# Patient Record
Sex: FEMALE | Race: WHITE | NOT HISPANIC OR LATINO | Employment: UNEMPLOYED | URBAN - METROPOLITAN AREA
[De-identification: names, ages, dates, MRNs, and addresses within clinical notes are randomized per-mention and may not be internally consistent; named-entity substitution may affect disease eponyms.]

---

## 2017-01-03 ENCOUNTER — COMMUNICATION - HEALTHEAST (OUTPATIENT)
Dept: SURGERY | Facility: CLINIC | Age: 32
End: 2017-01-03

## 2021-10-01 ENCOUNTER — TELEPHONE (OUTPATIENT)
Dept: NURSING | Facility: CLINIC | Age: 36
End: 2021-10-01

## 2021-10-01 ENCOUNTER — HOSPITAL ENCOUNTER (EMERGENCY)
Facility: CLINIC | Age: 36
Discharge: HOME | End: 2021-10-01
Attending: EMERGENCY MEDICINE | Admitting: EMERGENCY MEDICINE
Payer: COMMERCIAL

## 2021-10-01 ENCOUNTER — APPOINTMENT (OUTPATIENT)
Dept: CT IMAGING | Facility: CLINIC | Age: 36
End: 2021-10-01
Attending: EMERGENCY MEDICINE
Payer: COMMERCIAL

## 2021-10-01 VITALS
RESPIRATION RATE: 22 BRPM | HEART RATE: 88 BPM | SYSTOLIC BLOOD PRESSURE: 118 MMHG | OXYGEN SATURATION: 96 % | WEIGHT: 188 LBS | BODY MASS INDEX: 32.78 KG/M2 | DIASTOLIC BLOOD PRESSURE: 79 MMHG | TEMPERATURE: 98.1 F

## 2021-10-01 DIAGNOSIS — E88.09 HYPOALBUMINEMIA: ICD-10-CM

## 2021-10-01 DIAGNOSIS — R74.01 ELEVATED ALT MEASUREMENT: ICD-10-CM

## 2021-10-01 DIAGNOSIS — E83.51 HYPOCALCEMIA: ICD-10-CM

## 2021-10-01 DIAGNOSIS — R17 ELEVATED BILIRUBIN: ICD-10-CM

## 2021-10-01 DIAGNOSIS — E87.6 HYPOKALEMIA: ICD-10-CM

## 2021-10-01 DIAGNOSIS — U07.1 INFECTION DUE TO 2019 NOVEL CORONAVIRUS: Primary | ICD-10-CM

## 2021-10-01 LAB
ALBUMIN SERPL-MCNC: 2.5 G/DL (ref 3.4–5)
ALP SERPL-CCNC: 112 U/L (ref 40–150)
ALT SERPL W P-5'-P-CCNC: 54 U/L (ref 0–50)
ANION GAP SERPL CALCULATED.3IONS-SCNC: 9 MMOL/L (ref 3–14)
AST SERPL W P-5'-P-CCNC: 28 U/L (ref 0–45)
BASOPHILS # BLD AUTO: 0 10E3/UL (ref 0–0.2)
BASOPHILS NFR BLD AUTO: 0 %
BILIRUB SERPL-MCNC: 1.4 MG/DL (ref 0.2–1.3)
BUN SERPL-MCNC: 4 MG/DL (ref 7–30)
CALCIUM SERPL-MCNC: 7.9 MG/DL (ref 8.5–10.1)
CHLORIDE BLD-SCNC: 101 MMOL/L (ref 94–109)
CO2 SERPL-SCNC: 24 MMOL/L (ref 20–32)
CREAT SERPL-MCNC: 0.5 MG/DL (ref 0.52–1.04)
D DIMER PPP FEU-MCNC: 1.11 UG/ML FEU (ref 0–0.5)
EOSINOPHIL # BLD AUTO: 0 10E3/UL (ref 0–0.7)
EOSINOPHIL NFR BLD AUTO: 0 %
ERYTHROCYTE [DISTWIDTH] IN BLOOD BY AUTOMATED COUNT: 14.9 % (ref 10–15)
GFR SERPL CREATININE-BSD FRML MDRD: >90 ML/MIN/1.73M2
GLUCOSE BLD-MCNC: 86 MG/DL (ref 70–99)
HCT VFR BLD AUTO: 36.4 % (ref 35–47)
HGB BLD-MCNC: 11.8 G/DL (ref 11.7–15.7)
IMM GRANULOCYTES # BLD: 0.1 10E3/UL
IMM GRANULOCYTES NFR BLD: 1 %
LYMPHOCYTES # BLD AUTO: 1 10E3/UL (ref 0.8–5.3)
LYMPHOCYTES NFR BLD AUTO: 12 %
MCH RBC QN AUTO: 27.6 PG (ref 26.5–33)
MCHC RBC AUTO-ENTMCNC: 32.4 G/DL (ref 31.5–36.5)
MCV RBC AUTO: 85 FL (ref 78–100)
MONOCYTES # BLD AUTO: 0.6 10E3/UL (ref 0–1.3)
MONOCYTES NFR BLD AUTO: 7 %
NEUTROPHILS # BLD AUTO: 6.3 10E3/UL (ref 1.6–8.3)
NEUTROPHILS NFR BLD AUTO: 80 %
NRBC # BLD AUTO: 0 10E3/UL
NRBC BLD AUTO-RTO: 0 /100
PLATELET # BLD AUTO: 189 10E3/UL (ref 150–450)
POTASSIUM BLD-SCNC: 3.2 MMOL/L (ref 3.4–5.3)
PROT SERPL-MCNC: 6.6 G/DL (ref 6.8–8.8)
RBC # BLD AUTO: 4.27 10E6/UL (ref 3.8–5.2)
SODIUM SERPL-SCNC: 134 MMOL/L (ref 133–144)
TROPONIN I SERPL-MCNC: <0.015 UG/L (ref 0–0.04)
WBC # BLD AUTO: 7.9 10E3/UL (ref 4–11)

## 2021-10-01 PROCEDURE — 84484 ASSAY OF TROPONIN QUANT: CPT | Performed by: EMERGENCY MEDICINE

## 2021-10-01 PROCEDURE — 250N000013 HC RX MED GY IP 250 OP 250 PS 637: Performed by: EMERGENCY MEDICINE

## 2021-10-01 PROCEDURE — 250N000009 HC RX 250: Performed by: EMERGENCY MEDICINE

## 2021-10-01 PROCEDURE — 250N000011 HC RX IP 250 OP 636: Performed by: EMERGENCY MEDICINE

## 2021-10-01 PROCEDURE — 80053 COMPREHEN METABOLIC PANEL: CPT | Performed by: EMERGENCY MEDICINE

## 2021-10-01 PROCEDURE — 99285 EMERGENCY DEPT VISIT HI MDM: CPT | Mod: 25 | Performed by: EMERGENCY MEDICINE

## 2021-10-01 PROCEDURE — 85025 COMPLETE CBC W/AUTO DIFF WBC: CPT | Performed by: EMERGENCY MEDICINE

## 2021-10-01 PROCEDURE — 36415 COLL VENOUS BLD VENIPUNCTURE: CPT | Performed by: EMERGENCY MEDICINE

## 2021-10-01 PROCEDURE — 71275 CT ANGIOGRAPHY CHEST: CPT

## 2021-10-01 PROCEDURE — 93005 ELECTROCARDIOGRAM TRACING: CPT | Performed by: EMERGENCY MEDICINE

## 2021-10-01 PROCEDURE — 93010 ELECTROCARDIOGRAM REPORT: CPT | Performed by: EMERGENCY MEDICINE

## 2021-10-01 PROCEDURE — 93308 TTE F-UP OR LMTD: CPT | Performed by: EMERGENCY MEDICINE

## 2021-10-01 PROCEDURE — 85379 FIBRIN DEGRADATION QUANT: CPT | Performed by: EMERGENCY MEDICINE

## 2021-10-01 RX ORDER — POTASSIUM CHLORIDE 20MEQ/15ML
40 LIQUID (ML) ORAL ONCE
Status: COMPLETED | OUTPATIENT
Start: 2021-10-01 | End: 2021-10-01

## 2021-10-01 RX ORDER — VITAMIN B COMPLEX
TABLET ORAL DAILY
COMMUNITY

## 2021-10-01 RX ORDER — ASCORBIC ACID 100 MG
TABLET,CHEWABLE ORAL
Status: ON HOLD | COMMUNITY
End: 2021-11-30

## 2021-10-01 RX ORDER — ACETAMINOPHEN 500 MG
1000 TABLET ORAL ONCE
Status: COMPLETED | OUTPATIENT
Start: 2021-10-01 | End: 2021-10-01

## 2021-10-01 RX ORDER — IOPAMIDOL 755 MG/ML
100 INJECTION, SOLUTION INTRAVASCULAR ONCE
Status: COMPLETED | OUTPATIENT
Start: 2021-10-01 | End: 2021-10-01

## 2021-10-01 RX ORDER — ASPIRIN 81 MG/1
81 TABLET, CHEWABLE ORAL DAILY
Status: ON HOLD | COMMUNITY
End: 2021-12-01

## 2021-10-01 RX ADMIN — IOPAMIDOL 64 ML: 755 INJECTION, SOLUTION INTRAVENOUS at 19:38

## 2021-10-01 RX ADMIN — POTASSIUM CHLORIDE 40 MEQ: 40 SOLUTION ORAL at 18:51

## 2021-10-01 RX ADMIN — ACETAMINOPHEN 1000 MG: 500 TABLET, FILM COATED ORAL at 17:12

## 2021-10-01 RX ADMIN — SODIUM CHLORIDE 84 ML: 9 INJECTION, SOLUTION INTRAVENOUS at 19:49

## 2021-10-01 ASSESSMENT — ENCOUNTER SYMPTOMS
HEMATURIA: 0
COLOR CHANGE: 0
DIFFICULTY URINATING: 0
MYALGIAS: 1
DYSURIA: 0
VOMITING: 0
ABDOMINAL PAIN: 1
FEVER: 1
FREQUENCY: 0
NECK PAIN: 0
NUMBNESS: 0
DIARRHEA: 0
BACK PAIN: 0
WEAKNESS: 0
CHILLS: 1
FATIGUE: 1
CONFUSION: 0
SHORTNESS OF BREATH: 1
HEADACHES: 0
LIGHT-HEADEDNESS: 0
FLANK PAIN: 0
BRUISES/BLEEDS EASILY: 0
NAUSEA: 0
PALPITATIONS: 0
RHINORRHEA: 0
NECK STIFFNESS: 0
WOUND: 0
CONSTIPATION: 0
COUGH: 1

## 2021-10-01 NOTE — ED PROVIDER NOTES
ED Provider Note  [facility]      History     Chief Complaint   Patient presents with     Covid Concern     Covid +, increased SOB, 30 weeks pregnant      HPI  Jorge Anton is a 36 year old female who presented for evaluation of chest pain and shortness of breath.  The patient was diagnosed with COVID-19 approximately 10 days ago.  She is unvaccinated.  She is 30 weeks pregnant today.  with previous history of first trimester miscarriage otherwise remainder of pregnancies have been uncomplicated.  She notes that she had been experiencing a nonproductive cough since COVID-19 diagnosis however yesterday started to experience sternal chest pain and shortness of breath, prompting her to call her OB/GYN who recommended that she come to the ED today for evaluation.  She denies a history of PE/DVT, denies any recent leg swelling.  She has been taking aspirin since her COVID-19 diagnosis.  She also endorses fever and chills, has been taking scheduled Tylenol with improvement.  Denies any flank pain, dysuria, or hematuria.  She notes that with coughing she has some urination but denies any tasha leakage of fluids from her vagina.  Denies any vaginal bleeding.  She notes some generalized abdominal pain with coughing and myalgias, denies any tasha pelvic pain or sensation of contractions. The patient denies any other physical concerns today.     Past Medical History  No past medical history on file.  Past Surgical History:   Procedure Laterality Date      SECTION  10/18/2011    Procedure: SECTION; Surgeon:ZAHRA BRANCH; Location:[facility] L+[facility]      SECTION       LAPAROSCOPIC CHOLECYSTECTOMY N/A 2016    Procedure: LAPAROSCOPIC CHOLECYSTECTOMY;  Surgeon: Gonzalo Powell MD;  Location: [facility] [place], [place];  Service:      Ascorbic Acid (VITAMIN C) 100 MG CHEW  aspirin (ASA) 81 MG chewable tablet  PRENATAL VITAMINS PO  Vitamin D3 (CHOLECALCIFEROL) 25 mcg (1000  units) tablet      No Known Allergies  Family History  Family History   Problem Relation Age of Onset     Arthritis Mother      Cholelithiasis Mother      Cerebrovascular Disease Father      Social History   Social History     Tobacco Use     Smoking status: Never Smoker   Substance Use Topics     Alcohol use: No     Drug use: No      Past medical history, past surgical history, medications, allergies, family history, and social history were reviewed with the patient. No additional pertinent items.       Review of Systems   Constitutional: Positive for chills, fatigue and fever.   HENT: Negative for congestion, ear pain and rhinorrhea.    Eyes: Negative for visual disturbance.   Respiratory: Positive for cough and shortness of breath.    Cardiovascular: Positive for chest pain. Negative for palpitations.   Gastrointestinal: Positive for abdominal pain (Generalized with coughing). Negative for constipation, diarrhea, nausea and vomiting.   Genitourinary: Negative for difficulty urinating, dysuria, flank pain, frequency, hematuria, pelvic pain, urgency, vaginal bleeding and vaginal discharge.   Musculoskeletal: Positive for myalgias. Negative for back pain, neck pain and neck stiffness.   Skin: Negative for color change, rash and wound.   Allergic/Immunologic: Negative for immunocompromised state.   Neurological: Negative for syncope, weakness, light-headedness, numbness and headaches.   Hematological: Does not bruise/bleed easily.   Psychiatric/Behavioral: Negative for confusion.   All other systems reviewed and are negative.    A complete review of systems was performed with pertinent positives and negatives noted in the HPI, and all other systems negative.    Physical Exam   BP: 112/75  Pulse: (!) 122  Temp: 98.8  F (37.1  C)  Resp: 16  Weight: 85.3 kg (188 lb)  SpO2: 94 %     Physical Exam  Vitals and nursing note reviewed.   Constitutional:       General: She is not in acute distress.     Appearance: She is not  ill-appearing, toxic-appearing or diaphoretic.   HENT:      Head: Normocephalic and atraumatic.      Right Ear: External ear normal.      Left Ear: External ear normal.      Nose: Nose normal.      Mouth/Throat:      Mouth: Mucous membranes are moist.   Eyes:      Extraocular Movements: Extraocular movements intact.      Conjunctiva/sclera: Conjunctivae normal.      Pupils: Pupils are equal, round, and reactive to light.   Cardiovascular:      Rate and Rhythm: Regular rhythm. Tachycardia present.      Pulses: Normal pulses.      Heart sounds: Normal heart sounds. No murmur heard.   No friction rub. No gallop.    Pulmonary:      Effort: Pulmonary effort is normal. No respiratory distress.      Breath sounds: Normal breath sounds. No stridor. No wheezing, rhonchi or rales.   Chest:      Chest wall: No tenderness.   Abdominal:      General: Bowel sounds are normal. There is no distension.      Palpations: Abdomen is soft.      Tenderness: There is no abdominal tenderness. There is no right CVA tenderness, left CVA tenderness, guarding or rebound.      Comments: Gravid uterus.   Musculoskeletal:         General: Normal range of motion.      Cervical back: Normal range of motion and neck supple. No rigidity or tenderness.      Right lower leg: No edema.      Left lower leg: No edema.   Skin:     General: Skin is warm.      Capillary Refill: Capillary refill takes less than 2 seconds.   Neurological:      General: No focal deficit present.      Mental Status: She is alert.   Psychiatric:         Mood and Affect: Mood normal.         Behavior: Behavior normal.         ED Course     ED Course as of Oct 02 0001   Fri Oct 01, 2021   1641 Chamber size and motion were grossly normal with LV > RV, normal cardiac kinesis.  No pericardial effusion was found.  IVC visualized and findings indicate normovolemia.    POC US ECHO LIMITED   1805 WBC: 7.9   1805 Hemoglobin: 11.8   1805 Platelet Count: 189   1824 D-Dimer Quantitative(!):  1.11   1824 Sodium: 134   1824 Potassium(!): 3.2   1824 Creatinine(!): 0.50   1824 Glucose: 86   1825 Bilirubin Total(!): 1.4   1825 ALT(!): 54   1825 AST: 28   1825 Albumin(!): 2.5   1825 Protein Total(!): 6.6   1825 Alkaline Phosphatase: 112   1826 Troponin I: <0.015   2013 1.  No pulmonary embolism.  2.  Multiple peripheral geographic regions of groundglass infiltrates in the lungs with regions of intralobular thickening typical of COVID-19 pneumonia.   CT Chest Pulmonary Embolism w Contrast     Procedures: none.             EKG Interpretation:      Interpreted by Marcy Fowler MD  Time reviewed: 16:38  Symptoms at time of EKG: Chest pain, shortness of breath  Rhythm: sinus tachycardia  Rate: normal  Axis: normal  Ectopy: none  Conduction: normal  ST Segments/ T Waves: No ST elevation, possible 1 mm ST depression in V5 and V6 but otherwise no acute abnormalities.  Q Waves: none  Comparison to prior: Tachycardic today when compared to previous.    Clinical Impression: Sinus tachycardia.  _______________________________________________     The medical record was reviewed and interpreted.  Current labs reviewed and interpreted.  Current images reviewed and interpreted: as above.  EKG reviewed and interpreted: as above.      Medications   acetaminophen (TYLENOL) tablet 1,000 mg (1,000 mg Oral Given 10/1/21 1712)   potassium chloride (KAYCIEL) solution 40 mEq (40 mEq Oral Given 10/1/21 1851)   iopamidol (ISOVUE-370) solution 100 mL (64 mLs Intravenous Given 10/1/21 1938)   sodium chloride 0.9 % bag 100mL (84 mLs Intravenous Given 10/1/21 1949)        Assessments & Plan (with Medical Decision Making)   Nursing notes and vital signs reviewed.     Presentation, exam, and workup is most consistent with COVID-19 infection, elevated ALT/bilirubin, hypokalemia.    Please see HPI, ROS, exam, and ED course above for pertinent history and findings. In short, the patient presented to the ED for evaluation of known COVID-19  diagnosis with new onset of chest pain and shortness of breath.      Given that she is currently 30 weeks pregnant and has had COVID-19 for approximately 10 days, concern for pulmonary embolism given the onset of chest pain and shortness of breath.  D-dimer did return elevated 1110.  Discussed with the patient the risks and benefit of CT chest given that she is currently pregnant.  We did discuss that given that she is 30 weeks pregnant that the fetuses anatomy is grossly developed and less concern for radiation causing abnormal development at this time.  We discussed the risk of having underlying pulmonary embolism and not performing CT which could be life-threatening.  After shared decision making the patient is comfortable with undergoing CT chest for further evaluation given her increased risk during pregnancy and COVID-19 for pulmonary embolism; thankfully CT chest negative for acute evidence for PE.  Does show changes consistent with COVID-19 pneumonia.  Patient is afebrile without leukocytosis, these findings are most consistent with a viral COVID-19 and less concerning for bacterial pneumonia, no indication for antibiotics at this time.  Patient's SPO2 remained above 90% throughout her ED course.  We discussed using her pulse oximeter at home and discussed that this reads less than 90% that she needs started return right away for reevaluation.    Otherwise, EKG shows sinus tachycardia with possible 1 mm depression in V5 and V6 but otherwise without acute abnormalities, troponin undetectable, less concern for ACS at this time.  Labs with slight elevation of bilirubin and ALT, patient is status post cholecystectomy, overall less likely secondary to her COVID-19, we discussed that she needs to follow-up with her primary care physician for repeat labs within the next 1 week.  Also slightly low potassium and calcium as well as low protein and albumin, also discussed these findings with the patient and that she  needs recheck within the next 1 week.    In regards her pregnancy, OB/GYN was consulted and the patient was monitored on TOCO and cleared by OB for outpatient follow-up.  From their standpoint no indication for admission at this time.  And from her COVID-19 diagnosis standpoint no indication for medical admission.    On re-evaluation, the patient is resting comfortably after the above interventions. I discussed with the patient the results of the above studies. All questions were answered prior to discharge, and the patient was told to follow up with her primary care physician and OB per discharge instructions. Reasons for return as well as follow up were reviewed with the patient.  The patient expressed understanding and agreement.    Disposition: The patient was discharged to home in stable condition.      Final diagnoses:   Infection due to 2019 novel coronavirus   Elevated ALT measurement   Elevated bilirubin   Hypoalbuminemia   Hypocalcemia   Hypokalemia       --  Marcy Fowler MD   [facility] ED EMERGENCY DEPARTMENT  10/1/2021     Marcy Fowler MD  10/02/21 0005

## 2021-10-01 NOTE — TELEPHONE ENCOUNTER
Telephone call:    Pt stated that she talked with a provider that told her that she needs to go to the ED for chest pain, but she is wondering what location. She stated that she has been seen at [facility] where they were able to monitor her and baby at the same time.     Writer advised that she would start in the ED and notify them of her symptoms. No triage needed. Pt was already advised based on symptoms per her report to go to ED.     Marcy Wang RN  [facility] Nurse Advisor 3:25 PM 10/1/2021

## 2021-10-02 LAB
ATRIAL RATE - MUSE: 109 BPM
DIASTOLIC BLOOD PRESSURE - MUSE: NORMAL MMHG
INTERPRETATION ECG - MUSE: NORMAL
P AXIS - MUSE: 44 DEGREES
PR INTERVAL - MUSE: 130 MS
QRS DURATION - MUSE: 86 MS
QT - MUSE: 338 MS
QTC - MUSE: 455 MS
R AXIS - MUSE: 18 DEGREES
SYSTOLIC BLOOD PRESSURE - MUSE: NORMAL MMHG
T AXIS - MUSE: 34 DEGREES
VENTRICULAR RATE- MUSE: 109 BPM

## 2021-10-02 NOTE — DISCHARGE INSTRUCTIONS
"Follow-up with your OB/GYN on Monday for reevaluation.    As we discussed, your CT showed \"Multiple peripheral geographic regions of groundglass infiltrates in the lungs with regions of intralobular thickening typical of COVID-19 pneumonia.\"  As we also discussed your ALT (liver test) and bilirubin were slightly elevated, as well as your potassium, calcium, and albumin (protein) levels were low.  Please follow-up with your primary care doctor on Monday for reevaluation and repeat labs.    If you have any increase in shortness of breath or you have any oxygen saturation reading on pulse oximetry less than 90% or you have contractions or you have any other symptoms that are concerning to you please return for reevaluation.  "

## 2021-10-02 NOTE — PROGRESS NOTES
This RN bedside in ED for EFM. Dr Fish was paged at 2050, and spoke on phone at 2057 regarding EFM. No contractions noted, and episodic intermittent variables x3 noted during 40 minute monitoring session. Plan was to continue EFM x1 hour. At 2102, pt began having contractions. Mild per patient, and pt states they are the same as she has been having at home. Contractions palpated mild and soft at rest. Fetus is Cat I. Paged Dr Fish at 2151 with an update on FM, ctx stopped at 2153. Spoke to Dr Fish on phone and okay to discontinue EFM. Pt educated on FKC, monitoring for vaginal bleeding/leaking of flui, when to return to the hospital/L&D, and to follow up with primary OB provider this coming week. Pt verbalized understanding. Primary nurse updated on OB care.

## 2021-11-30 ENCOUNTER — HOSPITAL ENCOUNTER (INPATIENT)
Facility: CLINIC | Age: 36
LOS: 2 days | Discharge: HOME HEALTH | End: 2021-12-02
Attending: OBSTETRICS & GYNECOLOGY | Admitting: OBSTETRICS & GYNECOLOGY
Payer: COMMERCIAL

## 2021-11-30 DIAGNOSIS — O34.219 VBAC, DELIVERED: Primary | ICD-10-CM

## 2021-11-30 PROBLEM — Z90.49 HISTORY OF CHOLECYSTECTOMY: Status: ACTIVE | Noted: 2021-05-02

## 2021-11-30 PROBLEM — Z36.89 ENCOUNTER FOR TRIAGE IN PREGNANT PATIENT: Status: RESOLVED | Noted: 2021-11-30 | Resolved: 2021-11-30

## 2021-11-30 PROBLEM — Z34.90 ENCOUNTER FOR INDUCTION OF LABOR: Status: ACTIVE | Noted: 2021-11-30

## 2021-11-30 PROBLEM — U07.1 COVID-19: Status: ACTIVE | Noted: 2021-11-30

## 2021-11-30 PROBLEM — Z98.891 HISTORY OF C-SECTION: Status: ACTIVE | Noted: 2021-11-30

## 2021-11-30 PROBLEM — Z34.80 PRENATAL CARE OF MULTIGRAVIDA, ANTEPARTUM: Status: ACTIVE | Noted: 2021-04-19

## 2021-11-30 PROBLEM — Z82.79 FAMILY HISTORY OF NEUROFIBROMATOSIS: Status: ACTIVE | Noted: 2021-05-02

## 2021-11-30 PROBLEM — Z36.89 ENCOUNTER FOR TRIAGE IN PREGNANT PATIENT: Status: ACTIVE | Noted: 2021-11-30

## 2021-11-30 PROBLEM — O09.90 OBSTETRIC RISK IN CURRENTLY PREGNANT PATIENT: Status: ACTIVE | Noted: 2021-05-02

## 2021-11-30 LAB
ABO/RH(D): NORMAL
ALT SERPL W P-5'-P-CCNC: 34 U/L (ref 0–50)
ANTIBODY SCREEN: NEGATIVE
AST SERPL W P-5'-P-CCNC: 22 U/L (ref 0–45)
CREAT SERPL-MCNC: 0.46 MG/DL (ref 0.52–1.04)
CREAT UR-MCNC: 106 MG/DL
GFR SERPL CREATININE-BSD FRML MDRD: >90 ML/MIN/1.73M2
HOLD SPECIMEN: NORMAL
PLATELET # BLD AUTO: 205 10E3/UL (ref 150–450)
PROT UR-MCNC: 0.34 G/L
PROT/CREAT 24H UR: 0.32 G/G CR (ref 0–0.2)
SPECIMEN EXPIRATION DATE: NORMAL
T PALLIDUM AB SER QL: NONREACTIVE

## 2021-11-30 PROCEDURE — 84156 ASSAY OF PROTEIN URINE: CPT | Performed by: STUDENT IN AN ORGANIZED HEALTH CARE EDUCATION/TRAINING PROGRAM

## 2021-11-30 PROCEDURE — 84450 TRANSFERASE (AST) (SGOT): CPT | Performed by: STUDENT IN AN ORGANIZED HEALTH CARE EDUCATION/TRAINING PROGRAM

## 2021-11-30 PROCEDURE — 59025 FETAL NON-STRESS TEST: CPT

## 2021-11-30 PROCEDURE — 86780 TREPONEMA PALLIDUM: CPT | Performed by: STUDENT IN AN ORGANIZED HEALTH CARE EDUCATION/TRAINING PROGRAM

## 2021-11-30 PROCEDURE — 86900 BLOOD TYPING SEROLOGIC ABO: CPT | Performed by: STUDENT IN AN ORGANIZED HEALTH CARE EDUCATION/TRAINING PROGRAM

## 2021-11-30 PROCEDURE — 84460 ALANINE AMINO (ALT) (SGPT): CPT | Performed by: STUDENT IN AN ORGANIZED HEALTH CARE EDUCATION/TRAINING PROGRAM

## 2021-11-30 PROCEDURE — 258N000003 HC RX IP 258 OP 636: Performed by: STUDENT IN AN ORGANIZED HEALTH CARE EDUCATION/TRAINING PROGRAM

## 2021-11-30 PROCEDURE — 250N000009 HC RX 250: Performed by: STUDENT IN AN ORGANIZED HEALTH CARE EDUCATION/TRAINING PROGRAM

## 2021-11-30 PROCEDURE — 82565 ASSAY OF CREATININE: CPT | Performed by: STUDENT IN AN ORGANIZED HEALTH CARE EDUCATION/TRAINING PROGRAM

## 2021-11-30 PROCEDURE — 120N000002 HC R&B MED SURG/OB UMMC

## 2021-11-30 PROCEDURE — 85049 AUTOMATED PLATELET COUNT: CPT | Performed by: STUDENT IN AN ORGANIZED HEALTH CARE EDUCATION/TRAINING PROGRAM

## 2021-11-30 PROCEDURE — G0463 HOSPITAL OUTPT CLINIC VISIT: HCPCS | Mod: 25

## 2021-11-30 PROCEDURE — 36415 COLL VENOUS BLD VENIPUNCTURE: CPT | Performed by: STUDENT IN AN ORGANIZED HEALTH CARE EDUCATION/TRAINING PROGRAM

## 2021-11-30 RX ORDER — OXYTOCIN/0.9 % SODIUM CHLORIDE 30/500 ML
100-340 PLASTIC BAG, INJECTION (ML) INTRAVENOUS CONTINUOUS PRN
Status: DISCONTINUED | OUTPATIENT
Start: 2021-11-30 | End: 2021-12-02 | Stop reason: HOSPADM

## 2021-11-30 RX ORDER — TRANEXAMIC ACID 10 MG/ML
1 INJECTION, SOLUTION INTRAVENOUS EVERY 30 MIN PRN
Status: DISCONTINUED | OUTPATIENT
Start: 2021-11-30 | End: 2021-12-01 | Stop reason: HOSPADM

## 2021-11-30 RX ORDER — MISOPROSTOL 200 UG/1
800 TABLET ORAL
Status: DISCONTINUED | OUTPATIENT
Start: 2021-11-30 | End: 2021-12-01 | Stop reason: HOSPADM

## 2021-11-30 RX ORDER — IBUPROFEN 600 MG/1
600 TABLET, FILM COATED ORAL
Status: DISCONTINUED | OUTPATIENT
Start: 2021-11-30 | End: 2021-12-01

## 2021-11-30 RX ORDER — LIDOCAINE 40 MG/G
CREAM TOPICAL
Status: DISCONTINUED | OUTPATIENT
Start: 2021-11-30 | End: 2021-12-01 | Stop reason: HOSPADM

## 2021-11-30 RX ORDER — KETOROLAC TROMETHAMINE 30 MG/ML
30 INJECTION, SOLUTION INTRAMUSCULAR; INTRAVENOUS
Status: DISCONTINUED | OUTPATIENT
Start: 2021-11-30 | End: 2021-12-01

## 2021-11-30 RX ORDER — PROCHLORPERAZINE 25 MG
25 SUPPOSITORY, RECTAL RECTAL EVERY 12 HOURS PRN
Status: DISCONTINUED | OUTPATIENT
Start: 2021-11-30 | End: 2021-12-01 | Stop reason: HOSPADM

## 2021-11-30 RX ORDER — NALOXONE HYDROCHLORIDE 0.4 MG/ML
0.4 INJECTION, SOLUTION INTRAMUSCULAR; INTRAVENOUS; SUBCUTANEOUS
Status: DISCONTINUED | OUTPATIENT
Start: 2021-11-30 | End: 2021-12-01 | Stop reason: HOSPADM

## 2021-11-30 RX ORDER — SODIUM CHLORIDE, SODIUM LACTATE, POTASSIUM CHLORIDE, CALCIUM CHLORIDE 600; 310; 30; 20 MG/100ML; MG/100ML; MG/100ML; MG/100ML
INJECTION, SOLUTION INTRAVENOUS CONTINUOUS PRN
Status: DISCONTINUED | OUTPATIENT
Start: 2021-11-30 | End: 2021-12-01 | Stop reason: HOSPADM

## 2021-11-30 RX ORDER — TERBUTALINE SULFATE 1 MG/ML
0.25 INJECTION, SOLUTION SUBCUTANEOUS
Status: DISCONTINUED | OUTPATIENT
Start: 2021-11-30 | End: 2021-12-01 | Stop reason: HOSPADM

## 2021-11-30 RX ORDER — OXYTOCIN/0.9 % SODIUM CHLORIDE 30/500 ML
340 PLASTIC BAG, INJECTION (ML) INTRAVENOUS CONTINUOUS PRN
Status: DISCONTINUED | OUTPATIENT
Start: 2021-11-30 | End: 2021-12-01 | Stop reason: HOSPADM

## 2021-11-30 RX ORDER — OXYTOCIN 10 [USP'U]/ML
10 INJECTION, SOLUTION INTRAMUSCULAR; INTRAVENOUS
Status: DISCONTINUED | OUTPATIENT
Start: 2021-11-30 | End: 2021-12-01 | Stop reason: HOSPADM

## 2021-11-30 RX ORDER — CARBOPROST TROMETHAMINE 250 UG/ML
250 INJECTION, SOLUTION INTRAMUSCULAR
Status: DISCONTINUED | OUTPATIENT
Start: 2021-11-30 | End: 2021-12-01 | Stop reason: HOSPADM

## 2021-11-30 RX ORDER — MISOPROSTOL 200 UG/1
400 TABLET ORAL
Status: DISCONTINUED | OUTPATIENT
Start: 2021-11-30 | End: 2021-12-01 | Stop reason: HOSPADM

## 2021-11-30 RX ORDER — ONDANSETRON 4 MG/1
4 TABLET, ORALLY DISINTEGRATING ORAL EVERY 6 HOURS PRN
Status: DISCONTINUED | OUTPATIENT
Start: 2021-11-30 | End: 2021-12-01 | Stop reason: HOSPADM

## 2021-11-30 RX ORDER — OXYTOCIN 10 [USP'U]/ML
10 INJECTION, SOLUTION INTRAMUSCULAR; INTRAVENOUS
Status: DISCONTINUED | OUTPATIENT
Start: 2021-11-30 | End: 2021-12-02 | Stop reason: HOSPADM

## 2021-11-30 RX ORDER — CALCIUM CARBONATE 500 MG/1
1000 TABLET, CHEWABLE ORAL DAILY PRN
Status: DISCONTINUED | OUTPATIENT
Start: 2021-11-30 | End: 2021-12-02 | Stop reason: HOSPADM

## 2021-11-30 RX ORDER — LIDOCAINE 40 MG/G
CREAM TOPICAL
Status: DISCONTINUED | OUTPATIENT
Start: 2021-11-30 | End: 2021-11-30 | Stop reason: HOSPADM

## 2021-11-30 RX ORDER — METOCLOPRAMIDE 10 MG/1
10 TABLET ORAL EVERY 6 HOURS PRN
Status: DISCONTINUED | OUTPATIENT
Start: 2021-11-30 | End: 2021-12-01 | Stop reason: HOSPADM

## 2021-11-30 RX ORDER — PROCHLORPERAZINE MALEATE 10 MG
10 TABLET ORAL EVERY 6 HOURS PRN
Status: DISCONTINUED | OUTPATIENT
Start: 2021-11-30 | End: 2021-12-01 | Stop reason: HOSPADM

## 2021-11-30 RX ORDER — NALOXONE HYDROCHLORIDE 0.4 MG/ML
0.2 INJECTION, SOLUTION INTRAMUSCULAR; INTRAVENOUS; SUBCUTANEOUS
Status: DISCONTINUED | OUTPATIENT
Start: 2021-11-30 | End: 2021-12-01 | Stop reason: HOSPADM

## 2021-11-30 RX ORDER — MISOPROSTOL 200 UG/1
TABLET ORAL
Status: DISCONTINUED
Start: 2021-11-30 | End: 2021-12-01 | Stop reason: HOSPADM

## 2021-11-30 RX ORDER — METHYLERGONOVINE MALEATE 0.2 MG/ML
200 INJECTION INTRAVENOUS
Status: DISCONTINUED | OUTPATIENT
Start: 2021-11-30 | End: 2021-12-01 | Stop reason: HOSPADM

## 2021-11-30 RX ORDER — ONDANSETRON 2 MG/ML
4 INJECTION INTRAMUSCULAR; INTRAVENOUS EVERY 6 HOURS PRN
Status: DISCONTINUED | OUTPATIENT
Start: 2021-11-30 | End: 2021-12-01 | Stop reason: HOSPADM

## 2021-11-30 RX ORDER — OXYTOCIN 10 [USP'U]/ML
INJECTION, SOLUTION INTRAMUSCULAR; INTRAVENOUS
Status: DISCONTINUED
Start: 2021-11-30 | End: 2021-12-01 | Stop reason: HOSPADM

## 2021-11-30 RX ORDER — LIDOCAINE HYDROCHLORIDE 10 MG/ML
INJECTION, SOLUTION EPIDURAL; INFILTRATION; INTRACAUDAL; PERINEURAL
Status: DISCONTINUED
Start: 2021-11-30 | End: 2021-12-01 | Stop reason: HOSPADM

## 2021-11-30 RX ORDER — METOCLOPRAMIDE HYDROCHLORIDE 5 MG/ML
10 INJECTION INTRAMUSCULAR; INTRAVENOUS EVERY 6 HOURS PRN
Status: DISCONTINUED | OUTPATIENT
Start: 2021-11-30 | End: 2021-12-01 | Stop reason: HOSPADM

## 2021-11-30 RX ORDER — OXYTOCIN/0.9 % SODIUM CHLORIDE 30/500 ML
1-24 PLASTIC BAG, INJECTION (ML) INTRAVENOUS CONTINUOUS
Status: DISCONTINUED | OUTPATIENT
Start: 2021-11-30 | End: 2021-12-01 | Stop reason: HOSPADM

## 2021-11-30 RX ADMIN — Medication 2 MILLI-UNITS/MIN: at 17:25

## 2021-11-30 RX ADMIN — SODIUM CHLORIDE, POTASSIUM CHLORIDE, SODIUM LACTATE AND CALCIUM CHLORIDE: 600; 310; 30; 20 INJECTION, SOLUTION INTRAVENOUS at 17:18

## 2021-11-30 ASSESSMENT — ACTIVITIES OF DAILY LIVING (ADL)
WALKING_OR_CLIMBING_STAIRS_DIFFICULTY: NO
DIFFICULTY_COMMUNICATING: NO
TOILETING_ISSUES: NO
PATIENT_/_FAMILY_COMMUNICATION_STYLE: SPOKEN LANGUAGE (ENGLISH OR BILINGUAL)
HEARING_DIFFICULTY_OR_DEAF: NO
DRESSING/BATHING_DIFFICULTY: NO
DIFFICULTY_EATING/SWALLOWING: NO
VISION_MANAGEMENT: GLASSES
CONCENTRATING,_REMEMBERING_OR_MAKING_DECISIONS_DIFFICULTY: NO
WEAR_GLASSES_OR_BLIND: YES
FALL_HISTORY_WITHIN_LAST_SIX_MONTHS: NO
DOING_ERRANDS_INDEPENDENTLY_DIFFICULTY: NO

## 2021-11-30 ASSESSMENT — MIFFLIN-ST. JEOR: SCORE: 1561.32

## 2021-11-30 NOTE — PLAN OF CARE
Patient admitted to room 481 for Induction of labor.Vaginal exam performed by Dr. Lezama 3/50%. Plan to start Pitocin with continuous fetal monitoring. Verbal order to monitor blood pressures every half hour during early admission process. Reviewed with patient labor interventions and coping techniques such as bouncing on ball, ambulating, and frequent reposition. Telemetry monitor applied to assist patient with mobility. Reviewed monitoring of intake and output and diet restrictions. Patient in agreement with induction plan. Call light within reach.

## 2021-11-30 NOTE — H&P
[facility]  OB History & Physical      Jorge Anton MRN# [id number]   Age: 36 year old YOB: 1985     CC:  Elevated blood pressures    HPI:  Jorge Anton is a 36 year old  at 38w3d by LMP c/w 6w6d US, who presents with elevated blood pressure readings at home. She states that she has had a few elevated readings at prenatal visits at [facility], [place], however repeat was always normal. Today she noted some floaters in her vision, which is now improving. She denies headache, chest pain, SOB, RUQ pain.  She denies contractions, vaginal bleeding, and loss of fluid. Reports normal fetal movement.    She denies any past history of elevated blood pressures or preeclampsia in prior pregnancies. This is a new FOB and his prior partner had preeclampsia x2.    Denies any history of postpartum hemorrhage, high blood pressures, asthma, shoulder dystocia. She has had a laparoscopic cholecystectomy in addition to C/S x1.    ROS:   Complete 10-point ROS negative except as noted in HPI.She headache, chest pain, shortness of breath, RUQ pain.    Pregnancy Complications:  - Family history of neurofibromatosis, son with Type I  - H/o fetus with SVT and hydrops  - H/o C/S x1 for Cat 2 RFD,  x2  - COVID in pregnancy, 10/1/21    Prenatal Labs:   Lab Results   Component Value Date    ABO O 10/18/2011    RH  Pos 10/18/2011    AS Neg 10/18/2011    HEPBANG Negative 2016    CHPCRT  2011     Negative for C. trachomatis rRNA by transcription mediated amplification.   A negative result by transcription mediated amplification does not preclude the   presence of C. trachomatis infection because results are dependent on proper   and adequate collection, absence of inhibitors, and sufficient rRNA to be   detected.    GCPCRT  2011     Negative for N. gonorrhoeae rRNA by transcription mediated amplification.   A negative result by transcription mediated amplification does not preclude  the   presence of N. gonorrhoeae infection because results are dependent on proper   and adequate collection, absence of inhibitors, and sufficient rRNA to be   detected.    TREPAB Negative 2011    RPR Non-Reactive 2014    RUBELLAABIGG 27 2011    HGB 11.8 10/01/2021    HIV Negative 2011       GBS Status:   Lab Results   Component Value Date    GBS  10/14/2011     Negative: No GBS DNA detected, presumed negative for GBS or number of bacteria   may be below the limit of detection of the assay.   Assay performed on incubated broth culture of specimen using Cepheid   SmartCycler(R) real-time PCR.       OB History  OB History    Para Term  AB Living   6 4 3 1 1 4   SAB IAB Ectopic Multiple Live Births   1 0 0 1 4      # Outcome Date GA Lbr Ten/2nd Weight Sex Delivery Anes PTL Lv   6 Current            5A Term     F    YAS   5B             4 Term     M    YAS   3  10/18/11 35w3d  2.52 kg (5 lb 8.9 oz) M CS-LVertical Spinal N YAS      Name: ALMA DELIA GREEN      Apgar1: 8  Apgar5: 9   2 Term  40w4d  3.685 kg (8 lb 2 oz) M Vag-Spont   YAS      Name: Raiden   1 SAB                PMHx: None  PSHx:   Past Surgical History:   Procedure Laterality Date     SECTION  10/18/2011    Procedure: SECTION; Surgeon:ZAHRA BRANCH; Location: L+D    LAPAROSCOPIC CHOLECYSTECTOMY N/A 2016    Procedure: LAPAROSCOPIC CHOLECYSTECTOMY;  Surgeon: Gonzalo Powell MD;  Location: [facility];  Service:      Meds:   Medications Prior to Admission   Medication Sig Dispense Refill Last Dose    aspirin (ASA) 81 MG chewable tablet Take 81 mg by mouth daily   More than a month at Unknown time    PRENATAL VITAMINS PO Take  by mouth daily.   2021 at Unknown time    Vitamin D3 (CHOLECALCIFEROL) 25 mcg (1000 units) tablet Take by mouth daily   Unknown at Unknown time         Allergies:  No Known Allergies   FmHx:   Family History   Problem  "Relation Age of Onset    Arthritis Mother     Cholelithiasis Mother     Cerebrovascular Disease Father      SocHx:  She denies any tobacco, alcohol, or other drug use during this pregnancy.    PE:  Vit:   Patient Vitals for the past 24 hrs:   BP Temp Temp src Pulse Resp Height Weight   21 1100 133/83 98.1  F (36.7  C) Oral 91 20 -- --   21 1058 -- -- -- -- -- 1.6 m (5' 3\") 90.7 kg (200 lb)       Gen: Well-appearing, NAD, comfortable   CV: Well perfused, regular rate   Pulm: Breathing comfortably  Abd: Soft, gravid, non-tender   Ext: 1+ LE edema b/l           FHT: Baseline 140, mod variability, accelerations present, no decelerations     Assessment/Plan:  Jorge Anton is a 36 year old , at 38w3d by LMP c/w 6w6d US, who presents with elevated pressures at home, here for rule out preeclampsia.    Preeclampsia without Severe Features  - Patient with elevated pressures up to 150/90s today with floaters in her vision. Now with continued mild range pressures. Elevated pressures > 4 hours apart with history of BP > 140/90s at home, will continue to monitor BP closely  - UPC elevated at 0.32  - HELLP labs o/w wnl    IOL for Pre E w/out SF  TOLAC  - Admit to L&D  - Labor: Anticipate . Cervix is 3/50/-3 and patient is TOLAC. Will start pitocin. Consider AROM when fetal head is better applied  - FWB: Category 1 FHT.  Continue EFM and toco. EFW 8lbs.  - Pain: Desires unmedicated delivery, possibly considering nitrous.  - PNC: Rh pos, Rubella imm, GBS neg, GCT wnl, Placenta posterior  - Fen/GI: Reg diet, IVF    The patient was discussed with Dr. Eddy who is in agreement with the treatment plan.    Angeline Lezama MD  Ob/Gyn Resident, PGY-2  2021 11:27 AM    Appreciate Dr. Lezama's note above, patient also seen and examined by me. I agree with the note above.   Patty Eddy MD      "

## 2021-11-30 NOTE — PLAN OF CARE
Data: Patient presented to [facility] at 1015.   Reason for maternal/fetal assessment per patient is Rule Out Pre-eclampsia  .  Patient is a . Prenatal record reviewed.      OB History    Para Term  AB Living   6 4 3 1 1 4   SAB IAB Ectopic Multiple Live Births   1 0 0 1 4      # Outcome Date GA Lbr Ten/2nd Weight Sex Delivery Anes PTL Lv   6 Current            5A Term     F    YAS   5B             4 Term     M    YAS   3  10/18/11 35w3d  2.52 kg (5 lb 8.9 oz) M CS-LVertical Spinal N YAS      Name: ALMA DELIA GREEN MAMADOU      Apgar1: 8  Apgar5: 9   2 Term  40w4d  3.685 kg (8 lb 2 oz) M Vag-Spont   YAS      Name: Annetta   1 SAB            . Medical history: No past medical history on file.. Gestational Age 38w4d. VSS. Fetal movement present. Patient denies cramping, backache, vaginal discharge, pelvic pressure, UTI symptoms, GI problems, bloody show, vaginal bleeding, edema, headache, visual disturbances, epigastric or URQ pain, abdominal pain, rupture of membranes. Support persons are present.  Action: Verbal consent for EFM. Triage assessment completed. EFM applied for NST. Uterine assessment reveal occasional contraction. Fetal assessment: Presumed adequate fetal oxygenation documented (see flow record).   Response: Dr. Eddy informed of arrival. Plan per provider is admit for induction. Patient verbalized agreement with plan. Patient transferred to room 481 ambulatory, oriented to room and call light.

## 2021-12-01 PROBLEM — O34.219 VBAC, DELIVERED: Status: ACTIVE | Noted: 2021-12-01

## 2021-12-01 LAB — HGB BLD-MCNC: 11 G/DL (ref 11.7–15.7)

## 2021-12-01 PROCEDURE — 36415 COLL VENOUS BLD VENIPUNCTURE: CPT | Performed by: ADVANCED PRACTICE MIDWIFE

## 2021-12-01 PROCEDURE — 120N000002 HC R&B MED SURG/OB UMMC

## 2021-12-01 PROCEDURE — 722N000001 HC LABOR CARE VAGINAL DELIVERY SINGLE

## 2021-12-01 PROCEDURE — 250N000013 HC RX MED GY IP 250 OP 250 PS 637: Performed by: ADVANCED PRACTICE MIDWIFE

## 2021-12-01 PROCEDURE — 250N000011 HC RX IP 250 OP 636: Performed by: STUDENT IN AN ORGANIZED HEALTH CARE EDUCATION/TRAINING PROGRAM

## 2021-12-01 PROCEDURE — 85018 HEMOGLOBIN: CPT | Performed by: ADVANCED PRACTICE MIDWIFE

## 2021-12-01 PROCEDURE — 250N000009 HC RX 250: Performed by: STUDENT IN AN ORGANIZED HEALTH CARE EDUCATION/TRAINING PROGRAM

## 2021-12-01 PROCEDURE — 10907ZC DRAINAGE OF AMNIOTIC FLUID, THERAPEUTIC FROM PRODUCTS OF CONCEPTION, VIA NATURAL OR ARTIFICIAL OPENING: ICD-10-PCS | Performed by: ADVANCED PRACTICE MIDWIFE

## 2021-12-01 RX ORDER — METHYLERGONOVINE MALEATE 0.2 MG/ML
200 INJECTION INTRAVENOUS
Status: DISCONTINUED | OUTPATIENT
Start: 2021-12-01 | End: 2021-12-02 | Stop reason: HOSPADM

## 2021-12-01 RX ORDER — ACETAMINOPHEN 325 MG/1
650 TABLET ORAL EVERY 4 HOURS PRN
Status: DISCONTINUED | OUTPATIENT
Start: 2021-12-01 | End: 2021-12-02 | Stop reason: HOSPADM

## 2021-12-01 RX ORDER — HYDROCORTISONE 2.5 %
CREAM (GRAM) TOPICAL 3 TIMES DAILY PRN
Status: DISCONTINUED | OUTPATIENT
Start: 2021-12-01 | End: 2021-12-02 | Stop reason: HOSPADM

## 2021-12-01 RX ORDER — IBUPROFEN 800 MG/1
800 TABLET, FILM COATED ORAL EVERY 6 HOURS PRN
Status: DISCONTINUED | OUTPATIENT
Start: 2021-12-01 | End: 2021-12-02 | Stop reason: HOSPADM

## 2021-12-01 RX ORDER — MISOPROSTOL 200 UG/1
800 TABLET ORAL
Status: DISCONTINUED | OUTPATIENT
Start: 2021-12-01 | End: 2021-12-02 | Stop reason: HOSPADM

## 2021-12-01 RX ORDER — ACETAMINOPHEN 325 MG/1
650 TABLET ORAL EVERY 4 HOURS PRN
Start: 2021-12-01 | End: 2021-12-02

## 2021-12-01 RX ORDER — IBUPROFEN 800 MG/1
800 TABLET, FILM COATED ORAL EVERY 6 HOURS PRN
Start: 2021-12-01 | End: 2021-12-02

## 2021-12-01 RX ORDER — OXYTOCIN 10 [USP'U]/ML
10 INJECTION, SOLUTION INTRAMUSCULAR; INTRAVENOUS
Status: DISCONTINUED | OUTPATIENT
Start: 2021-12-01 | End: 2021-12-02 | Stop reason: HOSPADM

## 2021-12-01 RX ORDER — MISOPROSTOL 200 UG/1
400 TABLET ORAL
Status: DISCONTINUED | OUTPATIENT
Start: 2021-12-01 | End: 2021-12-02 | Stop reason: HOSPADM

## 2021-12-01 RX ORDER — DOCUSATE SODIUM 100 MG/1
100 CAPSULE, LIQUID FILLED ORAL DAILY
Start: 2021-12-02

## 2021-12-01 RX ORDER — OXYTOCIN/0.9 % SODIUM CHLORIDE 30/500 ML
340 PLASTIC BAG, INJECTION (ML) INTRAVENOUS CONTINUOUS PRN
Status: DISCONTINUED | OUTPATIENT
Start: 2021-12-01 | End: 2021-12-02 | Stop reason: HOSPADM

## 2021-12-01 RX ORDER — MODIFIED LANOLIN
OINTMENT (GRAM) TOPICAL
Status: DISCONTINUED | OUTPATIENT
Start: 2021-12-01 | End: 2021-12-02 | Stop reason: HOSPADM

## 2021-12-01 RX ORDER — CARBOPROST TROMETHAMINE 250 UG/ML
250 INJECTION, SOLUTION INTRAMUSCULAR
Status: DISCONTINUED | OUTPATIENT
Start: 2021-12-01 | End: 2021-12-02 | Stop reason: HOSPADM

## 2021-12-01 RX ORDER — DOCUSATE SODIUM 100 MG/1
100 CAPSULE, LIQUID FILLED ORAL DAILY
Status: DISCONTINUED | OUTPATIENT
Start: 2021-12-01 | End: 2021-12-02 | Stop reason: HOSPADM

## 2021-12-01 RX ORDER — TRANEXAMIC ACID 10 MG/ML
1 INJECTION, SOLUTION INTRAVENOUS EVERY 30 MIN PRN
Status: DISCONTINUED | OUTPATIENT
Start: 2021-12-01 | End: 2021-12-02 | Stop reason: HOSPADM

## 2021-12-01 RX ORDER — BISACODYL 10 MG
10 SUPPOSITORY, RECTAL RECTAL DAILY PRN
Status: DISCONTINUED | OUTPATIENT
Start: 2021-12-01 | End: 2021-12-02 | Stop reason: HOSPADM

## 2021-12-01 RX ADMIN — ACETAMINOPHEN 650 MG: 325 TABLET, FILM COATED ORAL at 17:03

## 2021-12-01 RX ADMIN — MISOPROSTOL 400 MCG: 200 TABLET ORAL at 02:11

## 2021-12-01 RX ADMIN — IBUPROFEN 800 MG: 800 TABLET, FILM COATED ORAL at 19:04

## 2021-12-01 RX ADMIN — ACETAMINOPHEN 650 MG: 325 TABLET, FILM COATED ORAL at 13:13

## 2021-12-01 RX ADMIN — IBUPROFEN 800 MG: 800 TABLET, FILM COATED ORAL at 07:35

## 2021-12-01 RX ADMIN — Medication 340 ML/HR: at 00:50

## 2021-12-01 RX ADMIN — IBUPROFEN 800 MG: 800 TABLET, FILM COATED ORAL at 13:12

## 2021-12-01 RX ADMIN — KETOROLAC TROMETHAMINE 30 MG: 30 INJECTION, SOLUTION INTRAMUSCULAR at 01:14

## 2021-12-01 RX ADMIN — DOCUSATE SODIUM 100 MG: 100 CAPSULE ORAL at 07:35

## 2021-12-01 RX ADMIN — ACETAMINOPHEN 650 MG: 325 TABLET, FILM COATED ORAL at 07:35

## 2021-12-01 NOTE — PLAN OF CARE
Data: Vital signs within normal limits. Postpartum checks within normal limits - see flow record. Patient eating and drinking normally. Patient able to empty bladder independently and is up ambulating. No apparent signs of infection. Patient performing self cares and is able to care for infant. Breastfeeding on cue. Mother stated two of her other children were on bili lights - sticky note left for provider.   Action: Patient stated she was comfortable and declined interventions. Patient education done about hand expression. See flow record.  Response: Positive attachment behaviors observed with infant. Support persons Bryce present.   Plan: Continue with plan of care.

## 2021-12-01 NOTE — PROGRESS NOTES
"Labor progress note    S:  Patient sitting on the ball, reports feeling some contractions.  Consents to cervical exam and amniotomy if safe.      O:  Blood pressure 130/87, pulse 80, temperature 97.6  F (36.4  C), temperature source Oral, resp. rate 18, height 1.6 m (5' 3\"), weight 90.7 kg (200 lb), not currently breastfeeding.  General appearance: comfortable.  Contractions: Every 1-3 minutes. 30-70 seconds duration.  Palpate: moderate.  FHT: Baseline 140 with moderate variability. Accelerations are present. No decelerations present.  ROM: amniotomy performed for large amount of clear fluid.  Pelvic exam: 4.5/ 50%/ Mid/ average/ -1  Chan Score: 7  Pitocin- 4 mu/min.,  Antibiotics- none      A:  IUP @ 38w4d IOL for pre-eclampsia without severe features   Fetal Heart rate tracing Category one  GBS- negative  Patient Active Problem List   Diagnosis     CARDIOVASCULAR SCREENING; LDL GOAL LESS THAN 160     Encounter for induction of labor     Family history of neurofibromatosis     History of cholecystectomy     Obstetric risk in currently pregnant patient     Prenatal care of multigravida, antepartum     History of      COVID-19         P:  Encouraged frequent position changes   Anticipate   ANNETTE Atkins CNM  " normal...

## 2021-12-01 NOTE — PLAN OF CARE
Data: Jorge Anton transferred to 7138 via wheelchair at 0301. Baby transferred via parent's arms.  Action: Receiving unit notified of transfer: Yes. Patient and family notified of room change. Report given to ALEXANDRA Harry at 0254. Belongings sent to receiving unit. Accompanied by Registered Nurse. Oriented patient to surroundings. Call light within reach. ID bands double-checked with receiving RN.  Response: Patient tolerated transfer and is stable.

## 2021-12-01 NOTE — PROVIDER NOTIFICATION
12/01/21 0205   Provider Notification   Provider Name/Title JOSE Moreno   Method of Notification Phone   Notification Reason Bleeding   On the phone with JOSE Moreno to report extra bleeding with a few clots. Fundus firm, U/U-1/U. Offered to get patient up and empty bladder, but patient does not feel urge to void. Per provider will give misoprostol.

## 2021-12-01 NOTE — PROGRESS NOTES
Post Partum Note day of delivery   SIGNIFICANT PROBLEMS:  Patient Active Problem List    Diagnosis Date Noted     , delivered 2021     Priority: Medium     Encounter for induction of labor 2021     Priority: Medium     History of  2021     Priority: Medium     Hx of , followed by c/s for fetal indications, then  x2.  Planning        COVID-19 2021     Priority: Medium     Patient had symptomatic infection 10/2021, nasal swab not performed on admission.  Currently asymptomatic.  Has not been vaccinated.       Family history of neurofibromatosis 2021     Priority: Medium     Formatting of this note might be different from the original.  Patient's biological son       History of cholecystectomy 2021     Priority: Medium     Obstetric risk in currently pregnant patient 2021     Priority: Medium     Formatting of this note might be different from the original.  2011: Hx of fetus with SVT, junctional tachycardia, and fetal hydrops.  C/S at 36w 1d.       Prenatal care of multigravida, antepartum 2021     Priority: Medium     Formatting of this note might be different from the original.  Patient seen for an initial OB visit 2021 and at [facility] for u/s on 21.  Message sent to clinical staff to reach out and assist her to schedule on 8/10/2021.    Formatting of this note might be different from the original.  Midwife patient  AMA, age 36 at delivery  LAST MENSTRUAL PERIOD. 3/4/12  HALEY 21.Working HALEY    Hx child with electrical issues in heart, diagnosed at 32 weeks gestation- C/S at 36 weeks  Hx child with neurofibromatosis   Current pregnancy is with a different father  Hx + GBS  Prepregnancy BMI: 31.59  Early GCT: not indicated as patient states she is physically active  ASA: not indicated  Dated by:   Would accept blood products: yes  Previous deliveries reviewed by MD:    screening:   Screening US:     Rh pending  GCT:  "  Hgb:  TDap:  GBS:  Hx of C/S x 1  MFMU score: 90.7%  Plans: TOLAC  TOLAC consent:   Placental location:       CARDIOVASCULAR SCREENING; LDL GOAL LESS THAN 160 10/31/2010     Priority: Medium       INTERVAL HISTORY:  /79   Pulse 92   Temp 98.2  F (36.8  C) (Oral)   Resp 18   Ht 1.6 m (5' 3\")   Wt 90.7 kg (200 lb)   Breastfeeding Unknown   BMI 35.43 kg/m    Pt stable, baby is rooming in.   Breast feeding status:initiated  Complications since 2 hours post delivery: None  # Pain Assessment:  Current Pain Score 12/1/2021   Patient currently in pain? denies   Pain score (0-10) -   Pain location -   Jorge callahan pain level was assessed and she currently denies pain.      Patient is tolerating activity well, Voiding without difficulty, cramping is relieved by Ibuprophen, lochia is decreasing and patient denies clots.  Perineal pain is is relieved by Ibuprophen.  The perineum is intact    Physical Exam:  General: Bright affect, loving with baby. Family supportive.   Breasts: soft, nontender, nipples intact, no cracking, redness or bruising.   Abdomen: soft, nontender, fundus below U.   Lochia: small amount, rubra, no clots or odor.   Perineum: well-approximated.   Extremities: no edema, Jaky's negative.     Postpartum hemoglobin   Hemoglobin   Date Value Ref Range Status   12/01/2021 11.0 (L) 11.7 - 15.7 g/dL Final   10/19/2011 11.4 (L) 11.7 - 15.7 g/dL Final     Blood type   Lab Results   Component Value Date    ABO O 10/18/2011       Lab Results   Component Value Date    RH  Pos 10/18/2011     Rubella status No results found for: FRED  Rubella: immue  History of depression: no    ASSESSMENT/PLAN:  Normal postpartum exam , Stable Post-partum day #0  Complications:none  Plan d/c home tomorrow. Home Visit Ordered- No  RTC  2 and 6 weeks  Postpartum warning s/s reviewed, including bleeding/clots, fever, mastitis, or depression  Kegels/ crunches  Continue prenatal vitamins  Birthcontrol planned:Undecided. Fertility " and contraception options reviewed.  Current Discharge Medication List      CONTINUE these medications which have NOT CHANGED    Details   aspirin (ASA) 81 MG chewable tablet Take 81 mg by mouth daily      PRENATAL VITAMINS PO Take  by mouth daily.      Vitamin D3 (CHOLECALCIFEROL) 25 mcg (1000 units) tablet Take by mouth daily           ANNETTE Champagne CNM

## 2021-12-01 NOTE — PROGRESS NOTES
"Labor progress note    S:  Assuming cares at 1900 ([facility] transferred [facility], desires CNM care).  Patient feeling well at this time.  Denies headache, visual changes or epigastric pain.    O:  Blood pressure 130/87, pulse 80, temperature 97.6  F (36.4  C), temperature source Oral, resp. rate 18, height 1.6 m (5' 3\"), weight 90.7 kg (200 lb), not currently breastfeeding.  General appearance: comfortable.  Contractions: Every 2-6 minutes. 40-90 seconds duration.  Palpate: mild.  FHT: Baseline 140 with moderate variability. Accelerations are present. No decelerations present.  ROM: not ruptured.   Pelvic exam:  deferred  Pitocin- 4 mu/min.,  Antibiotics- none      A:  IUP @ 38w4d IOL for pre-eclampsia without severe features   Fetal Heart rate tracing Category one  GBS- negative  Covid positive 10/1/2021   Patient Active Problem List   Diagnosis     CARDIOVASCULAR SCREENING; LDL GOAL LESS THAN 160     Encounter for induction of labor     Family history of neurofibromatosis     History of cholecystectomy     Obstetric risk in currently pregnant patient     Prenatal care of multigravida, antepartum     History of      COVID-19         P:  Reviewed CNM care in labor as long as Magnesium sulfate is not needed.  Recovered symptomatic Covid positive from 10/2021, encouraged mask wearing.  Will offer vaccine PP  TOLAC consent signed and placed in chart.    Consider amniotomy when contraction pattern is consistent  Anticipate   ANNETTE AtkinsM  "

## 2021-12-01 NOTE — PROGRESS NOTES
"Labor progress note    S:  Patient reporting increased pelvic pressure with contractions.  Breathing well, changing positions frequently.  Requesting cervical exam at this time.    O:  Blood pressure (!) 141/95, pulse 79, temperature 97.7  F (36.5  C), temperature source Oral, resp. rate 16, height 1.6 m (5' 3\"), weight 90.7 kg (200 lb), not currently breastfeeding.  General appearance: uncomfortable with contractions.  Contractions: Every 2 minutes. 40-60 seconds duration.  Palpate: strong.  FHT: Baseline 140 with moderate variability. Accelerations are present. Occasional variable decelerations present.  ROM: clear fluid.   Pelvic exam: 5/ 100%/ Mid/ soft/ 0    Pitocin- 4 mu/min.,  Antibiotics- none      A:  IUP @ 38w5d IOL for pre-eclampsia without severe features   Fetal Heart rate tracing Category one  GBS- negative  Patient Active Problem List   Diagnosis     CARDIOVASCULAR SCREENING; LDL GOAL LESS THAN 160     Encounter for induction of labor     Family history of neurofibromatosis     History of cholecystectomy     Obstetric risk in currently pregnant patient     Prenatal care of multigravida, antepartum     History of      COVID-19         P:  Labor support given   Anticipate   ANNETTE Atkins CNM  "

## 2021-12-01 NOTE — PLAN OF CARE
VSS and afebrile. Infant girl @0049 and placed skin to skin for lusty cry. Mother/father bonding well. Infant breastfeeding. Waiting for stool and void from infant. Mother uterus firm and at umbilicus or 1 above. Unable to void. Mother having extra bleeding after delivery and misoprostol given. Bleeding is none to scant after receiving misoprostol. Patient and infant doing well. Will continue with plan of care.

## 2021-12-01 NOTE — PLAN OF CARE
Pt breast feeding independently. Vitals & PP assessments within normal range. Up at amie & soaking in tub. Voiding without difficulties. PO pain meds relieved her cramping. Indep with self & baby cares.  Encouraged to complete paper work.   Will continue on supportive cares.

## 2021-12-01 NOTE — PROGRESS NOTES
Patient arrived to [facility] unit via wheelchair at 0300,with belongings, accompanied by spouse/ significant other, with infant in arms. Received report from Charmaine MORRIS and checked bands. Unit and room orientation completd. Call light given; no concerns present at this time. Continue with plan of care.

## 2021-12-01 NOTE — PLAN OF CARE
Patient up walking around room and changing positions frequently including bouncing on ball, side lying, utilizing peanut ball, and swaying. Rupture of membranes by Demetrice Moreno CNM this evening with large amount of clear fluid. Contractions intensified shortly following rupture tracing every 1-3 minutes. States is coping with pain right now and declines pain interventions at this time. Trace edema noted to lower extremities. Vital signs remain within order parameters. Category 1 fetal tracing. Pitocin infusing at 4 milliunits, continue to titrate.Patient aware when to call for RN and verbalized agreement with labor progression plan.  and friend at bedside providing supporting. Continue to evaluate.

## 2021-12-01 NOTE — L&D DELIVERY NOTE
Delivery Summary    Jorge Anton MRN# [id number]   Age: 36 year old YOB: 1985     ASSESSMENT & PLAN:   DELIVERY NOTE:  Brief Labor Course: Patient is an out of hospital transfer from [facility] for evaluation of elevated blood pressure at home.  Patient was evaluated and diagnosed with pre-eclampsia without severe features and was admitted for induction of labor.  Cervix was favorable, Pitocin initiated.  TOLAC consent signed and placed in chart.  Patient consented to amniotomy when fetal station was lower and regular contraction pattern was established.  Amniotomy performed for large amount of clear fluid.  Patient utilized Nitrous oxide for a short time in transition labor.    Delivery Note:   Patient progressed to complete rapidly after amniotomy performed.  Began spontaneous maternal pushing efforts with excellent progress while on right lateral side.  Fetal head brought to a crown in the MOA position, loose nuchal cord reduced prior to delivery.  Fetus restituted to LOT, anterior shoulder delivered without difficulty under gentle downward traction and maternal effort.  Vigorous male infant delivered and placed on maternal abdomen.  IV Pitocin infusing.  Delayed cord clamping completed and cord doubly clamped and cut by father of the baby.  Placenta delivered spontaneously intact via Schultze mechanism with 3 vessel cord.  4cm umbilical cord varix noted.  Perineum inspected, small skin laceration to right of introitus, hemostatic not requiring repair.  Mother and baby stable in recovery.    IUP at 38 weeks gestation delivered on 2021.     delivery of a viable Male infant.  Weight : 7 pounds 8 ounces 3420gms  Apgars of 9 at 1 minute and 9 at 5 minutes.  Labor was induced.  Medications administered  in labor:  Pain Rx Nitrous Oxide; Antibiotics No;   Perineum: Minor laceration - No repair  Placenta-mechanism: spontaneous, intact,  with a 3 vessel cord. IV  oxytocin was given After delivery of baby Before delivery of placenta  Quantitative Blood Loss was 522cc.  Complications of labor and delivery: umbilical cord varix  Anticipated Discharge Date: 2021  Birth attendants: JOSE Martinez APRN CNM       [facility] [[id number][facility]    Labor Event Times    Labor onset date: 21 Onset time:  9:15 PM   Dilation complete date: 21 Complete time: 12:40 AM   Start pushing date/time: 2021 0040      Labor Length    1st Stage (hrs): 3 (min): 25   2nd Stage (hrs): 0 (min): 9   3rd Stage (hrs): 0 (min): 6      Labor Events     labor?: No   steroids: None  Labor Type: TOLAC (Trial of Labor After )  Predominate monitoring during 1st stage: continuous electronic fetal monitoring     Antibiotics received during labor?: No     Rupture date/time: 21   Rupture type: Artificial Rupture of Membranes  Fluid color: Clear  Fluid odor: Normal     Induction: Oxytocin, AROM  Induction date/time:     Cervical ripening date/time:     Indications for induction: Hypertension     1:1 continuous labor support provided by?: RN Labor partogram used?: no      Delivery/Placenta Date and Time    Delivery Date: 21 Delivery Time: 12:49 AM   Placenta Date/Time: 2021 12:55 AM  Oxytocin given at the time of delivery: after delivery of baby  Delivering clinician: Demetrice Moreno APRN CNM   Other personnel present at delivery:  Provider Role   Charmaine Roman RN Fike, Cortni S, RN          Vaginal Counts     Initial count performed by 2 team members:  Two Team Members   JOSE Moreno       Needles Suture Needles Sponges (RETIRED) Instruments   Initial counts 2 0 5    Added to count       Relief counts       Final counts 2 0 5          Placed during labor Accounted for at the end of labor   FSE No NA   IUPC No NA   Cervadil No NA              Final count performed by 2 team members:  Two Team Members   JOSE Moreno  "  Charmaine Roman RN         Apgars    Living status: Living   1 Minute 5 Minute 10 Minute 15 Minute 20 Minute   Skin color: 1  1       Heart rate: 2  2       Reflex irritability: 2  2       Muscle tone: 2  2       Respiratory effort: 2  2       Total: 9  9       Apgars assigned by: JOHN MORRIS     Cord    Vessels: 3 Vessels    Cord Complications: Nuchal   Nuchal Intervention: reduced         Nuchal cord description: loose nuchal cord         Cord Blood Disposition: Lab    Gases Sent?: No    Delayed cord clamping?: Yes    Cord Clamping Delay (seconds):  seconds    Stem cell collection?: No        Resuscitation    Methods: None   Care at Delivery:  of viable male infant. To mother's abdomen, dried and stimulated with vigorous cry. Remains skin to skin.     [place] Measurements    Weight: 7 lb 8.6 oz Length: 1' 8.75\"   Head circumference: 33.7 cm       Skin to Skin and Feeding Plan    Skin to skin initiation date/time: 1841    Skin to skin with: Mother  Skin to skin end date/time: 1841    Breastfeeding initiated date/time: 2021 0105     Delivery (Maternal) (Provider to Complete) ([id number])    Episiotomy: None  Perineal lacerations: None    Repair suture: None     Blood Loss  Mother: Jorge Anton #[id number]   Start of Mother's Information    Delivery Blood Loss  21 2115 - 21 0607    Delivery QBL (mL) Hospital Encounter 522 mL    Total  522 mL         End of Mother's Information  Mother: Jorge Anton #[id number]          Delivery - Provider to Complete ([id number])    Delivering clinician: Demetrice Moreno APRN CNM  CNM Care: Any CNM care in labor  Attempted Delivery Types (Choose all that apply): Vaginal Birth After   Delivery Type (Choose the 1 that will go to the Birth History): , Spontaneous                   Other personnel:  Provider Role   Charmaine Roman RN Fike, Cortni S, RN                 Placenta    Date/Time: 2021 12:55 " AM  Removal: Spontaneous  Comments: Intact via Schultze mechanism with 3 vessl cord.  4cm cord varix noted.  Disposition: Hospital disposal           Anesthesia    Method: Nitrous Oxide                Presentation and Position    Presentation: Vertex    Position: Middle Occiput Anterior                 ANNETTE Atkins CNM

## 2021-12-02 VITALS
TEMPERATURE: 97.7 F | HEART RATE: 72 BPM | HEIGHT: 63 IN | BODY MASS INDEX: 35.44 KG/M2 | SYSTOLIC BLOOD PRESSURE: 117 MMHG | WEIGHT: 200 LBS | RESPIRATION RATE: 16 BRPM | DIASTOLIC BLOOD PRESSURE: 77 MMHG

## 2021-12-02 PROCEDURE — 250N000013 HC RX MED GY IP 250 OP 250 PS 637: Performed by: ADVANCED PRACTICE MIDWIFE

## 2021-12-02 RX ORDER — ACETAMINOPHEN 325 MG/1
650 TABLET ORAL EVERY 4 HOURS PRN
Start: 2021-12-02

## 2021-12-02 RX ORDER — IBUPROFEN 800 MG/1
800 TABLET, FILM COATED ORAL EVERY 6 HOURS PRN
Qty: 30 TABLET | Refills: 0 | Status: SHIPPED | OUTPATIENT
Start: 2021-12-02

## 2021-12-02 RX ADMIN — DOCUSATE SODIUM 100 MG: 100 CAPSULE ORAL at 07:41

## 2021-12-02 RX ADMIN — IBUPROFEN 800 MG: 800 TABLET, FILM COATED ORAL at 07:42

## 2021-12-02 NOTE — PLAN OF CARE
Data: Vital signs within normal limits. Postpartum checks within normal limits - see flow record. Patient eating and drinking normally. Patient able to empty bladder independently and is up ambulating. No apparent signs of infection. Patient performing self cares and is able to care for infant. Breastfeeding on cue, infant feeding frequently overnight.  Action: Patient stated she was comfortable and declined pain medications. Patient education done about CCHD and  screen. See flow record.  Response: Positive attachment behaviors observed with infant. Support persons Bryce present.   Plan: Anticipate discharge on .

## 2021-12-02 NOTE — PLAN OF CARE
Pt discharged to home. Instructions given/reviewed. ID bands double checked. Pt had no further questions &verbalized understanding her plan. Instructed to follow up at clinic within 2 & 6 weeks for PP check.

## 2021-12-02 NOTE — PLAN OF CARE
VSS, fundus firm, bleeding scant. Breastfeeding independently. Pain controlled with scheduled meds. FOB at bedside supportive. Will continue with plan of care.

## 2021-12-02 NOTE — DISCHARGE SUMMARY
[facility] Discharge Summary    Jorge Anton MRN# [id number]   Age: 36 year old YOB: 1985     Date of Admission:  2021  Date of Discharge::  2021  Admitting Physician:  Demetrice Moreno, ANNETTE GARCIA  Discharge Physician:  ANNETTE Champagne CNM, CNM, MS      Home clinic: [facility]           Admission Diagnoses:   Encounter for triage in pregnant patient [Z36.89]  Encounter for induction of labor [Z34.90]  , delivered [O34.219]          Discharge Diagnosis:   Normal spontaneous vaginal delivery  Intrauterine pregnancy at 38w5 weeks gestation          Procedures:   Procedure(s): No additional procedures performed               Medications Prior to Admission:     Medications Prior to Admission   Medication Sig Dispense Refill Last Dose     PRENATAL VITAMINS PO Take  by mouth daily.   2021 at Unknown time     Vitamin D3 (CHOLECALCIFEROL) 25 mcg (1000 units) tablet Take by mouth daily   Unknown at Unknown time     [DISCONTINUED] aspirin (ASA) 81 MG chewable tablet Take 81 mg by mouth daily   More than a month at Unknown time             Discharge Medications:     Current Discharge Medication List      START taking these medications    Details   acetaminophen (TYLENOL) 325 MG tablet Take 2 tablets (650 mg) by mouth every 4 hours as needed for mild pain or fever (greater than or equal to 38  C /100.4  F (oral) or 38.5  C/ 101.4  F (core).)    Associated Diagnoses: , delivered      docusate sodium (COLACE) 100 MG capsule Take 1 capsule (100 mg) by mouth daily    Associated Diagnoses: , delivered      ibuprofen (ADVIL/MOTRIN) 800 MG tablet Take 1 tablet (800 mg) by mouth every 6 hours as needed for other (cramping)  Qty: 30 tablet, Refills: 0    Associated Diagnoses: , delivered         CONTINUE these medications which have NOT CHANGED    Details   PRENATAL VITAMINS PO Take  by mouth daily.      Vitamin D3 (CHOLECALCIFEROL) 25 mcg (1000 units) tablet Take by  mouth daily         STOP taking these medications       aspirin (ASA) 81 MG chewable tablet Comments:   Reason for Stopping:                     Consultations:   No consultations were requested during this admission          Brief History of Labor:     DELIVERY NOTE:  Brief Labor Course: Patient is an out of hospital transfer from [facility] for evaluation of elevated blood pressure at home.  Patient was evaluated and diagnosed with pre-eclampsia without severe features and was admitted for induction of labor.  Cervix was favorable, Pitocin initiated.  TOLAC consent signed and placed in chart.  Patient consented to amniotomy when fetal station was lower and regular contraction pattern was established.  Amniotomy performed for large amount of clear fluid.  Patient utilized Nitrous oxide for a short time in transition labor.    Delivery Note:   Patient progressed to complete rapidly after amniotomy performed.  Began spontaneous maternal pushing efforts with excellent progress while on right lateral side.  Fetal head brought to a crown in the MOA position, loose nuchal cord reduced prior to delivery.  Fetus restituted to LOT, anterior shoulder delivered without difficulty under gentle downward traction and maternal effort.  Vigorous male infant delivered and placed on maternal abdomen.  IV Pitocin infusing.  Delayed cord clamping completed and cord doubly clamped and cut by father of the baby.  Placenta delivered spontaneously intact via Schultze mechanism with 3 vessel cord.  4cm umbilical cord varix noted.  Perineum inspected, small skin laceration to right of introitus, hemostatic not requiring repair.  Mother and baby stable in recovery.     IUP at 38 weeks gestation delivered on 2021.     delivery of a viable Male infant.  Weight : 7 pounds 8 ounces 3420gms  Apgars of 9 at 1 minute and 9 at 5 minutes.  Labor was induced.  Medications administered  in labor:  Pain Rx Nitrous  "Oxide; Antibiotics No;   Perineum: Minor laceration - No repair  Placenta-mechanism: spontaneous, intact,  with a 3 vessel cord. IV oxytocin was given After delivery of baby Before delivery of placenta  Quantitative Blood Loss was 522cc.  Complications of labor and delivery: umbilical cord varix  Anticipated Discharge Date: 2021  Birth attendants: JOSE Martinez APRN CNM      Assessment Day of Discharge    Vital signs:  Temp: 97.7  F (36.5  C) Temp src: Oral BP: 117/77 Pulse: 72   Resp: 16       Height: 160 cm (5' 3\") Weight: 90.7 kg (200 lb)  Estimated body mass index is 35.43 kg/m  as calculated from the following:    Height as of this encounter: 1.6 m (5' 3\").    Weight as of this encounter: 90.7 kg (200 lb).      Breasts: Soft, filling  Nipples: Intact, Non-tender  Abdomen: Soft, Non-tender    Diastatis Recti:  2 FB  Uterus: Fundus Firm, Non-tender, located 2 below the umbilicus   Lochia: Rubra, appropriate amount    Perineum:  Well-approximated, healing well  Lower Extremities:  Edema Bilateral, Negative Jaky's Sign           Hospital Course:   The patient's hospital course was unremarkable.  On discharge, her pain was well controlled. Vaginal bleeding is similar to peak menstrual flow.  Voiding without difficulty.  Ambulating well and tolerating a normal diet.  No fever.  Breastfeeding well.  Infant is stable.  No bowel movement yet.*  She was discharged on post-partum day #1.    Post-partum hemoglobin:   Hemoglobin   Date Value Ref Range Status   2021 11.0 (L) 11.7 - 15.7 g/dL Final   10/19/2011 11.4 (L) 11.7 - 15.7 g/dL Final        Rh:POS  Rubella status:immune   Plan for contraception: condoms   Reviewed Chapter One of  [facility] including warning signs of postpartum, activity level, avoiding IC for 6 weeks, Tub soaks BID, Kegels, abdominal exercises, breast care,  postpartum depression/anxiety. Pt verbalized understanding with teach back.          Discharge " Instructions and Follow-Up:   Discharge diet: Regular   Discharge activity: Activity as tolerated   Discharge follow-up: Follow up with midwife  in 2 and 6 weeks   Wound care: Drink plenty of fluids           Discharge Disposition:   Discharged to home        ANNETTE Champagne CNM

## 2021-12-07 ENCOUNTER — ALLIED HEALTH/NURSE VISIT (OUTPATIENT)
Dept: OBGYN | Facility: CLINIC | Age: 36
End: 2021-12-07
Payer: COMMERCIAL

## 2021-12-07 ENCOUNTER — HOSPITAL ENCOUNTER (INPATIENT)
Facility: CLINIC | Age: 36
LOS: 2 days | Discharge: HOME | End: 2021-12-09
Attending: ADVANCED PRACTICE MIDWIFE | Admitting: OBSTETRICS & GYNECOLOGY
Payer: COMMERCIAL

## 2021-12-07 ENCOUNTER — TELEPHONE (OUTPATIENT)
Dept: OBGYN | Facility: CLINIC | Age: 36
End: 2021-12-07
Payer: COMMERCIAL

## 2021-12-07 VITALS — DIASTOLIC BLOOD PRESSURE: 101 MMHG | SYSTOLIC BLOOD PRESSURE: 155 MMHG

## 2021-12-07 DIAGNOSIS — O34.219 VBAC, DELIVERED: Primary | ICD-10-CM

## 2021-12-07 LAB
ABO/RH(D): NORMAL
ALT SERPL W P-5'-P-CCNC: 33 U/L (ref 0–50)
ANTIBODY SCREEN: NEGATIVE
AST SERPL W P-5'-P-CCNC: 16 U/L (ref 0–45)
CREAT SERPL-MCNC: 0.7 MG/DL (ref 0.52–1.04)
ERYTHROCYTE [DISTWIDTH] IN BLOOD BY AUTOMATED COUNT: 14.8 % (ref 10–15)
GFR SERPL CREATININE-BSD FRML MDRD: >90 ML/MIN/1.73M2
HCT VFR BLD AUTO: 37.5 % (ref 35–47)
HGB BLD-MCNC: 11.9 G/DL (ref 11.7–15.7)
MCH RBC QN AUTO: 28.3 PG (ref 26.5–33)
MCHC RBC AUTO-ENTMCNC: 31.7 G/DL (ref 31.5–36.5)
MCV RBC AUTO: 89 FL (ref 78–100)
PLATELET # BLD AUTO: 259 10E3/UL (ref 150–450)
RBC # BLD AUTO: 4.2 10E6/UL (ref 3.8–5.2)
SPECIMEN EXPIRATION DATE: NORMAL
WBC # BLD AUTO: 7.3 10E3/UL (ref 4–11)

## 2021-12-07 PROCEDURE — 36415 COLL VENOUS BLD VENIPUNCTURE: CPT | Performed by: STUDENT IN AN ORGANIZED HEALTH CARE EDUCATION/TRAINING PROGRAM

## 2021-12-07 PROCEDURE — 120N000002 HC R&B MED SURG/OB UMMC

## 2021-12-07 PROCEDURE — 84450 TRANSFERASE (AST) (SGOT): CPT | Performed by: STUDENT IN AN ORGANIZED HEALTH CARE EDUCATION/TRAINING PROGRAM

## 2021-12-07 PROCEDURE — 250N000013 HC RX MED GY IP 250 OP 250 PS 637: Performed by: STUDENT IN AN ORGANIZED HEALTH CARE EDUCATION/TRAINING PROGRAM

## 2021-12-07 PROCEDURE — 999N000103 HC STATISTIC NO CHARGE FACILITY FEE

## 2021-12-07 PROCEDURE — 86850 RBC ANTIBODY SCREEN: CPT | Performed by: STUDENT IN AN ORGANIZED HEALTH CARE EDUCATION/TRAINING PROGRAM

## 2021-12-07 PROCEDURE — 84460 ALANINE AMINO (ALT) (SGPT): CPT | Performed by: STUDENT IN AN ORGANIZED HEALTH CARE EDUCATION/TRAINING PROGRAM

## 2021-12-07 PROCEDURE — 82565 ASSAY OF CREATININE: CPT | Performed by: STUDENT IN AN ORGANIZED HEALTH CARE EDUCATION/TRAINING PROGRAM

## 2021-12-07 PROCEDURE — 258N000003 HC RX IP 258 OP 636: Performed by: STUDENT IN AN ORGANIZED HEALTH CARE EDUCATION/TRAINING PROGRAM

## 2021-12-07 PROCEDURE — 99221 1ST HOSP IP/OBS SF/LOW 40: CPT | Mod: 24 | Performed by: OBSTETRICS & GYNECOLOGY

## 2021-12-07 PROCEDURE — 85027 COMPLETE CBC AUTOMATED: CPT | Performed by: STUDENT IN AN ORGANIZED HEALTH CARE EDUCATION/TRAINING PROGRAM

## 2021-12-07 PROCEDURE — 250N000011 HC RX IP 250 OP 636: Performed by: STUDENT IN AN ORGANIZED HEALTH CARE EDUCATION/TRAINING PROGRAM

## 2021-12-07 RX ORDER — CALCIUM GLUCONATE 94 MG/ML
1 INJECTION, SOLUTION INTRAVENOUS
Status: DISCONTINUED | OUTPATIENT
Start: 2021-12-07 | End: 2021-12-09 | Stop reason: HOSPADM

## 2021-12-07 RX ORDER — OXYTOCIN 10 [USP'U]/ML
10 INJECTION, SOLUTION INTRAMUSCULAR; INTRAVENOUS
Status: DISCONTINUED | OUTPATIENT
Start: 2021-12-07 | End: 2021-12-09 | Stop reason: HOSPADM

## 2021-12-07 RX ORDER — IBUPROFEN 800 MG/1
800 TABLET, FILM COATED ORAL EVERY 6 HOURS PRN
Status: DISCONTINUED | OUTPATIENT
Start: 2021-12-07 | End: 2021-12-09 | Stop reason: HOSPADM

## 2021-12-07 RX ORDER — LIDOCAINE 40 MG/G
CREAM TOPICAL
Status: DISCONTINUED | OUTPATIENT
Start: 2021-12-07 | End: 2021-12-09 | Stop reason: HOSPADM

## 2021-12-07 RX ORDER — LABETALOL HYDROCHLORIDE 5 MG/ML
20-80 INJECTION, SOLUTION INTRAVENOUS EVERY 10 MIN PRN
Status: DISCONTINUED | OUTPATIENT
Start: 2021-12-07 | End: 2021-12-09 | Stop reason: HOSPADM

## 2021-12-07 RX ORDER — MAGNESIUM SULFATE HEPTAHYDRATE 40 MG/ML
4 INJECTION, SOLUTION INTRAVENOUS
Status: DISCONTINUED | OUTPATIENT
Start: 2021-12-07 | End: 2021-12-09 | Stop reason: HOSPADM

## 2021-12-07 RX ORDER — MAGNESIUM SULFATE HEPTAHYDRATE 500 MG/ML
10 INJECTION, SOLUTION INTRAMUSCULAR; INTRAVENOUS
Status: DISCONTINUED | OUTPATIENT
Start: 2021-12-07 | End: 2021-12-09 | Stop reason: HOSPADM

## 2021-12-07 RX ORDER — MISOPROSTOL 200 UG/1
800 TABLET ORAL
Status: DISCONTINUED | OUTPATIENT
Start: 2021-12-07 | End: 2021-12-09 | Stop reason: HOSPADM

## 2021-12-07 RX ORDER — ACETAMINOPHEN 325 MG/1
650 TABLET ORAL EVERY 4 HOURS PRN
Status: DISCONTINUED | OUTPATIENT
Start: 2021-12-07 | End: 2021-12-09 | Stop reason: HOSPADM

## 2021-12-07 RX ORDER — MISOPROSTOL 200 UG/1
400 TABLET ORAL
Status: DISCONTINUED | OUTPATIENT
Start: 2021-12-07 | End: 2021-12-09 | Stop reason: HOSPADM

## 2021-12-07 RX ORDER — OXYTOCIN/0.9 % SODIUM CHLORIDE 30/500 ML
340 PLASTIC BAG, INJECTION (ML) INTRAVENOUS CONTINUOUS PRN
Status: DISCONTINUED | OUTPATIENT
Start: 2021-12-07 | End: 2021-12-09 | Stop reason: HOSPADM

## 2021-12-07 RX ORDER — MAGNESIUM SULFATE HEPTAHYDRATE 40 MG/ML
4 INJECTION, SOLUTION INTRAVENOUS ONCE
Status: COMPLETED | OUTPATIENT
Start: 2021-12-07 | End: 2021-12-07

## 2021-12-07 RX ORDER — HYDROCORTISONE 2.5 %
CREAM (GRAM) TOPICAL 3 TIMES DAILY PRN
Status: DISCONTINUED | OUTPATIENT
Start: 2021-12-07 | End: 2021-12-09 | Stop reason: HOSPADM

## 2021-12-07 RX ORDER — LORAZEPAM 2 MG/ML
2 INJECTION INTRAMUSCULAR
Status: DISCONTINUED | OUTPATIENT
Start: 2021-12-07 | End: 2021-12-09 | Stop reason: HOSPADM

## 2021-12-07 RX ORDER — BISACODYL 10 MG
10 SUPPOSITORY, RECTAL RECTAL DAILY PRN
Status: DISCONTINUED | OUTPATIENT
Start: 2021-12-07 | End: 2021-12-09 | Stop reason: HOSPADM

## 2021-12-07 RX ORDER — DOCUSATE SODIUM 100 MG/1
100 CAPSULE, LIQUID FILLED ORAL DAILY PRN
Status: DISCONTINUED | OUTPATIENT
Start: 2021-12-07 | End: 2021-12-09 | Stop reason: HOSPADM

## 2021-12-07 RX ORDER — NIFEDIPINE 30 MG/1
30 TABLET, EXTENDED RELEASE ORAL DAILY
Status: DISCONTINUED | OUTPATIENT
Start: 2021-12-07 | End: 2021-12-09 | Stop reason: HOSPADM

## 2021-12-07 RX ORDER — MAGNESIUM SULFATE HEPTAHYDRATE 40 MG/ML
2 INJECTION, SOLUTION INTRAVENOUS
Status: DISCONTINUED | OUTPATIENT
Start: 2021-12-07 | End: 2021-12-09 | Stop reason: HOSPADM

## 2021-12-07 RX ORDER — MAGNESIUM SULFATE IN WATER 40 MG/ML
2 INJECTION, SOLUTION INTRAVENOUS CONTINUOUS
Status: DISCONTINUED | OUTPATIENT
Start: 2021-12-07 | End: 2021-12-08

## 2021-12-07 RX ORDER — HYDRALAZINE HYDROCHLORIDE 20 MG/ML
10 INJECTION INTRAMUSCULAR; INTRAVENOUS
Status: DISCONTINUED | OUTPATIENT
Start: 2021-12-07 | End: 2021-12-09 | Stop reason: HOSPADM

## 2021-12-07 RX ORDER — SODIUM CHLORIDE, SODIUM LACTATE, POTASSIUM CHLORIDE, CALCIUM CHLORIDE 600; 310; 30; 20 MG/100ML; MG/100ML; MG/100ML; MG/100ML
10-125 INJECTION, SOLUTION INTRAVENOUS CONTINUOUS
Status: DISCONTINUED | OUTPATIENT
Start: 2021-12-07 | End: 2021-12-09 | Stop reason: HOSPADM

## 2021-12-07 RX ORDER — TRANEXAMIC ACID 10 MG/ML
1 INJECTION, SOLUTION INTRAVENOUS EVERY 30 MIN PRN
Status: DISCONTINUED | OUTPATIENT
Start: 2021-12-07 | End: 2021-12-09 | Stop reason: HOSPADM

## 2021-12-07 RX ORDER — CARBOPROST TROMETHAMINE 250 UG/ML
250 INJECTION, SOLUTION INTRAMUSCULAR
Status: DISCONTINUED | OUTPATIENT
Start: 2021-12-07 | End: 2021-12-09 | Stop reason: HOSPADM

## 2021-12-07 RX ORDER — MODIFIED LANOLIN
OINTMENT (GRAM) TOPICAL
Status: DISCONTINUED | OUTPATIENT
Start: 2021-12-07 | End: 2021-12-09 | Stop reason: HOSPADM

## 2021-12-07 RX ORDER — METHYLERGONOVINE MALEATE 0.2 MG/ML
200 INJECTION INTRAVENOUS
Status: DISCONTINUED | OUTPATIENT
Start: 2021-12-07 | End: 2021-12-09 | Stop reason: HOSPADM

## 2021-12-07 RX ADMIN — MAGNESIUM SULFATE HEPTAHYDRATE 2 G/HR: 40 INJECTION, SOLUTION INTRAVENOUS at 16:25

## 2021-12-07 RX ADMIN — SODIUM CHLORIDE, POTASSIUM CHLORIDE, SODIUM LACTATE AND CALCIUM CHLORIDE 75 ML/HR: 600; 310; 30; 20 INJECTION, SOLUTION INTRAVENOUS at 15:49

## 2021-12-07 RX ADMIN — MAGNESIUM SULFATE HEPTAHYDRATE 4 G: 40 INJECTION, SOLUTION INTRAVENOUS at 15:49

## 2021-12-07 RX ADMIN — IBUPROFEN 800 MG: 800 TABLET ORAL at 15:49

## 2021-12-07 RX ADMIN — ACETAMINOPHEN 650 MG: 325 TABLET, FILM COATED ORAL at 15:48

## 2021-12-07 RX ADMIN — NIFEDIPINE 30 MG: 30 TABLET, FILM COATED, EXTENDED RELEASE ORAL at 15:49

## 2021-12-07 NOTE — DISCHARGE SUMMARY
[facility] Discharge Summary    Jorge Anton MRN# [id number]   Age: 36 year old YOB: 1985     Date of Admission:  2021  Date of Discharge:  2021  Admitting Physician:  Patty Philippe MD  Discharge Physician:  Patty Eddy MD    Admit Dx:   - PPD#6 s/p   - Postpartum preeclampsia with severe features  - COVID in pregnancy, 10/1/21    Discharge Dx:  - Same as above    Procedures:  - None    Admit HPI:  Jorge Anton is a 36 year old  PPD#6 from , who presented to clinic with elevated BP at home to 155/110. She presented to clinic today and was noted to have BP of 151/101, 157/111, 162/102, and 145/92. Patient has had a worsening headache since last night. Has had floaters in her vision and light sensitivity. Denies chest pain, SOB, RUQ pain.     She was initially admitted to labor and delivery during her intrapartum course for elevated blood pressures and was diagnosed with preeclampsia without severe features due to elevated UPC. Blood pressures were mostly normal to mild range during her admission, however she did have a few high mild range pressures and one severe range pressure which was shortly after her delivery. She was discharged from her her delivery without any BP medications on PPD#1.   Please see her that admission H&P and Delivery Summary for further details.    Hospital Course:  Patient was started on IV Magnesium which continued for 24 hours. HELLP labs on admission were wnl. Patient was started on Nifedipine XL 30mg daily. BP on admission were high mild range, but improved to normal to mild range with IV Mg and daily Nifedipine. Headache and vision changes resolved. Patient was discharge on HD#3 with the above blood pressure regimen.    Discharge Medications:     Review of your medicines        START taking        Dose / Directions   NIFEdipine ER 30 MG 24 hr tablet  Commonly known as: ADALAT CC  Used for: Hypertension in  pregnancy, preeclampsia, severe, postpartum condition      Dose: 30 mg  Take 1 tablet (30 mg) by mouth daily  Quantity: 40 tablet  Refills: 0            CONTINUE these medicines which have NOT CHANGED        Dose / Directions   acetaminophen 325 MG tablet  Commonly known as: TYLENOL  Used for: , delivered      Dose: 650 mg  Take 2 tablets (650 mg) by mouth every 4 hours as needed for mild pain or fever (greater than or equal to 38  C /100.4  F (oral) or 38.5  C/ 101.4  F (core).)  Refills: 0     docusate sodium 100 MG capsule  Commonly known as: COLACE  Used for: , delivered      Dose: 100 mg  Take 1 capsule (100 mg) by mouth daily  Refills: 0     ibuprofen 800 MG tablet  Commonly known as: ADVIL/MOTRIN  Used for: , delivered      Dose: 800 mg  Take 1 tablet (800 mg) by mouth every 6 hours as needed for other (cramping)  Quantity: 30 tablet  Refills: 0     PRENATAL VITAMINS PO      Take  by mouth daily.  Refills: 0     Vitamin D3 25 mcg (1000 units) tablet  Commonly known as: CHOLECALCIFEROL      Take by mouth daily  Refills: 0               Where to get your medicines        These medications were sent to [facility] [address]  [address] [address]      Phone: [phone]   NIFEdipine ER 30 MG 24 hr tablet         Discharge/Disposition:  Jorge Anton was discharged to home in stable condition with the following instructions/medications:  1) Call for temperature > 100.4, bright red vaginal bleeding >1 pad an hour x 2 hours, foul smelling vaginal discharge, pain not controlled by usual oral pain meds, persistent nausea and vomiting not controlled on medications, headache, vision changes, chest pain, SOB< RUQ pain.  2) She was instructed to follow-up with her primary OB in 1 week for BP check and 6 weeks for a routine postpartum visit.    Elaina Handy MD  OB/GYN, PGY-2  2021, 6:48 AM     I saw and evaluated this patient prior to  discharge. I discussed the patient with the resident and agree with plan of care as documented in the resident note.   I personally reviewed vital signs, medications, labs.   I personally spent 10 minutes on discharge activities.  Patty Eddy MD

## 2021-12-07 NOTE — TELEPHONE ENCOUNTER
Pt called with c/o HA and BP elevated at 155/110.  Discussed with CNM Demetrice Moreno & Mary Alice Sebastian.  Pt needs to be admitted per CNM.  On Call OBGYN Dr Philippe for admittance to post partum.  Transport ordered and pt was transported via wheel chair to the 7th floor.  Pt was accompanied by  Bryce and infant in car seat.   Pt's car is parked in the red ramp at this time.

## 2021-12-07 NOTE — H&P
[facility]  OB History and Physical      Jorge Anton MRN# [id number]   Age: 36 year old YOB: 1985     CC:  Elevated BP at home and in clinic, headache, vision changes    HPI:  Jorge Anton is a 36 year old  PPD#6 from , who presented to clinic with elevated BP at home to 155/110. She presented to clinic today and was noted to have BP of 151/101, 157/111, 162/102, and 145/92. She alos reported sever range pressure at home earlier today.Patient has had a worsening headache since last night. Has had floaters in her vision and light sensitivity. Denies chest pain, SOB, RUQ pain.    She was initially admitted to labor and delivery during her intrapartum course for elevated blood pressures and was diagnosed with preeclampsia without severe features due to elevated UPC. Blood pressures were mostly normal to mild range during her admission, however she did have a few high mild range pressures and one severe range pressure which was shortly after her delivery. She was discharged without any BP medications on PPD#1.     She denies history of high blood pressures outside of this pregnancy or asthma. She has had 1 prior  section and a laparoscopic cholecystectomy.    ROS:   Complete 10-point ROS negative except as noted in HPI.    Pregnancy Complications:  - Family history of neurofibromatosis, son with Type I  - H/o fetus with SVT and hydrops  - H/o C/S x1 for Cat 2 RFD,  x2  - COVID in pregnancy, 10/1/21  - Preeclampsia with severe features    Prenatal Labs:   Lab Results   Component Value Date    ABO O 10/18/2011    RH  Pos 10/18/2011    AS Negative 2021    HEPBANG Negative 2016    CHPCRT  2011     Negative for C. trachomatis rRNA by transcription mediated amplification.   A negative result by transcription mediated amplification does not preclude the   presence of C. trachomatis infection because results are dependent on proper   and adequate  collection, absence of inhibitors, and sufficient rRNA to be   detected.    GCPCRT  2011     Negative for N. gonorrhoeae rRNA by transcription mediated amplification.   A negative result by transcription mediated amplification does not preclude the   presence of N. gonorrhoeae infection because results are dependent on proper   and adequate collection, absence of inhibitors, and sufficient rRNA to be   detected.    TREPAB Negative 2011    RPR Non-Reactive 2014    RUBELLAABIGG 27 2011    HGB 11.0 (L) 2021    HIV Negative 2011       GBS Status:   Lab Results   Component Value Date    GBS  10/14/2011     Negative: No GBS DNA detected, presumed negative for GBS or number of bacteria   may be below the limit of detection of the assay.   Assay performed on incubated broth culture of specimen using Cepheid   SmartCycler(R) real-time PCR.       OB History  OB History    Para Term  AB Living   6 5 4 1 1 5   SAB IAB Ectopic Multiple Live Births   1 0 0 0 5      # Outcome Date GA Lbr Ten/2nd Weight Sex Delivery Anes PTL Lv   6 Term 21 38w5d 03:25 / 00:09 3.42 kg (7 lb 8.6 oz) M  Nitrous N YAS      Name: ARLINEMALE-MAMADOU      Apgar1: 9  Apgar5: 9   5 Term     F    YAS   4 Term     M    YAS   3  10/18/11 35w3d  2.52 kg (5 lb 8.9 oz) M CS-LTranv Spinal N YAS      Name: NORMA,ALMA DELIA MAMADOU      Apgar1: 8  Apgar5: 9   2 Term  40w4d  3.685 kg (8 lb 2 oz) M Vag-Spont   YAS      Name: Rakiel   1 SAB                PMHx:   No past medical history on file.  PSHx:   Past Surgical History:   Procedure Laterality Date     SECTION  10/18/2011    Procedure: SECTION; Surgeon:ZAHRA BRANCH; Location:[facility] L+D    LAPAROSCOPIC CHOLECYSTECTOMY N/A 2016    Procedure: LAPAROSCOPIC CHOLECYSTECTOMY;  Surgeon: Gonzalo Powell MD;  Location: [facility];  Service:      Meds:   Medications Prior to Admission   Medication Sig Dispense  Refill Last Dose    acetaminophen (TYLENOL) 325 MG tablet Take 2 tablets (650 mg) by mouth every 4 hours as needed for mild pain or fever (greater than or equal to 38  C /100.4  F (oral) or 38.5  C/ 101.4  F (core).)       docusate sodium (COLACE) 100 MG capsule Take 1 capsule (100 mg) by mouth daily       ibuprofen (ADVIL/MOTRIN) 800 MG tablet Take 1 tablet (800 mg) by mouth every 6 hours as needed for other (cramping) 30 tablet 0     PRENATAL VITAMINS PO Take  by mouth daily.       Vitamin D3 (CHOLECALCIFEROL) 25 mcg (1000 units) tablet Take by mouth daily        Allergies:  No Known Allergies   FmHx:   Family History   Problem Relation Age of Onset    Arthritis Mother     Cholelithiasis Mother     Cerebrovascular Disease Father      SocHx:  She denies any tobacco, alcohol, or other drug use during this pregnancy.    PE:  Vit:   Patient Vitals for the past 4 hrs:   BP Temp Temp src Pulse Resp   21 1414 (!) 153/95 98.9  F (37.2  C) Oral 87 18      Gen: Crying, appears tired  CV: RRR, no m/r/g  Pulm: CTAB  Abd: Soft, non-tender, fundus firm and well below umbilicus  Ext: 2+ reflexes in brachioradialis b/l, 1+ LE edema b/l    Assessment/Plan:  Jorge Anton is a 36 year old  HD#1 for postpartum preeclampsia with severe features, PPD#6 from  with the CNM service    Preeclampsia with severe features  - BP: High mild range on admission, continue to monitor closely. IV labetalol protocol for severe range pressures  - UOP: Strict Is/Os.   - Symptoms: Headache, floaters in vision. Denies any other symptoms.    - HELLP labs pending  - Mag sulfate for seizure prophylaxis: 4g > 2g/hr  - Next clinical mag check at 1900    Routine postpartum care: continue usual management    The patient was discussed with Dr. Philippe who is in agreement with the treatment plan.    Angeline Lezama MD  Obstetrics & Gynecology, PGY-2  2021 3:04 PM  I saw and evaluated the patient. I agree with the   findings and the plan  of care as documented in the resident's note.  MD Mariluz

## 2021-12-07 NOTE — PLAN OF CARE
Data: Jorge Anton transferred to  [place] [address] via EMS stretcher at 1445.  Mom was escorted by dad and infant. Patient was met after dropoff and was settled.  Providers came soon after and provided orders to start Magnesium protocol.  Action: Receiving unit notified of transfer: Yes. Patient and family notified of room change.  Belongings sent to receiving unit.  Oriented patient to surroundings. Call light within reach.  Response: Patient tolerated transfer and is stable.  Patient care was transferred at 1530.

## 2021-12-08 PROCEDURE — 250N000011 HC RX IP 250 OP 636: Performed by: STUDENT IN AN ORGANIZED HEALTH CARE EDUCATION/TRAINING PROGRAM

## 2021-12-08 PROCEDURE — 120N000002 HC R&B MED SURG/OB UMMC

## 2021-12-08 PROCEDURE — 258N000003 HC RX IP 258 OP 636: Performed by: STUDENT IN AN ORGANIZED HEALTH CARE EDUCATION/TRAINING PROGRAM

## 2021-12-08 PROCEDURE — 250N000013 HC RX MED GY IP 250 OP 250 PS 637: Performed by: STUDENT IN AN ORGANIZED HEALTH CARE EDUCATION/TRAINING PROGRAM

## 2021-12-08 RX ADMIN — MAGNESIUM SULFATE HEPTAHYDRATE 2 G/HR: 40 INJECTION, SOLUTION INTRAVENOUS at 13:45

## 2021-12-08 RX ADMIN — NIFEDIPINE 30 MG: 30 TABLET, FILM COATED, EXTENDED RELEASE ORAL at 09:04

## 2021-12-08 RX ADMIN — IBUPROFEN 800 MG: 800 TABLET ORAL at 12:15

## 2021-12-08 RX ADMIN — ACETAMINOPHEN 650 MG: 325 TABLET, FILM COATED ORAL at 14:45

## 2021-12-08 RX ADMIN — ACETAMINOPHEN 650 MG: 325 TABLET, FILM COATED ORAL at 18:14

## 2021-12-08 RX ADMIN — IBUPROFEN 800 MG: 800 TABLET ORAL at 18:14

## 2021-12-08 RX ADMIN — ACETAMINOPHEN 650 MG: 325 TABLET, FILM COATED ORAL at 04:33

## 2021-12-08 RX ADMIN — SODIUM CHLORIDE, POTASSIUM CHLORIDE, SODIUM LACTATE AND CALCIUM CHLORIDE 75 ML/HR: 600; 310; 30; 20 INJECTION, SOLUTION INTRAVENOUS at 04:36

## 2021-12-08 RX ADMIN — IBUPROFEN 800 MG: 800 TABLET ORAL at 04:33

## 2021-12-08 RX ADMIN — ACETAMINOPHEN 650 MG: 325 TABLET, FILM COATED ORAL at 09:04

## 2021-12-08 NOTE — PROVIDER NOTIFICATION
12/08/21 0241   Provider Notification   Provider Name/Title MD Handy   Method of Notification Electronic Page   Request Evaluate in Person   Notification Reason Status Update   7136 M.W fyi on my exam I did not get any reflexes. pt  has been hyporeflexic; no other s/s of mag toxicity. pt reports mild floaters in vision that has been consistent thank you Alana MORRIS [id number]

## 2021-12-08 NOTE — PLAN OF CARE
Data: Vital signs within normal limits. Postpartum Magnesium protocol checks - see flow record. Patient eating and drinking normally. Patient able to empty bladder independently and is up ambulating. No apparent signs of infection. Patient performing self cares.  Patient did not rest much today due to infusion pump beeping intermittently and infant nursing and cares.  This afternoon reencouraged her to rest and nap as a priority.  Action: Patient medicated during the shift for pain. See MAR. Patient reassessed within 1 hour after each medication and pain was improved - patient stated she was more comfortable. Patient education done about good self care. See flow record.    Response: Support persons was present.   Plan: Anticipate discharge.

## 2021-12-08 NOTE — PROGRESS NOTES
Jorge Anton  : 1985  MRN: [id number]    Postpartum Magnesium Check    S: Patient feeling well. Denies headache (has resolved from earlier), vision changes, chest pain, SOB, RUQ pain, or worsening edema.    O: Patient Vitals for the past 24 hrs:   BP Temp Temp src Pulse Resp SpO2   21 2245 (!) 141/90 -- -- 70 18 --   21 2105 133/78 98  F (36.7  C) Oral 77 18 96 %   21 1915 129/86 -- -- -- 18 --   21 1800 (!) 134/94 -- -- -- 18 --   21 1700 (!) 146/90 -- -- -- 18 --   21 1645 (!) 115/90 -- -- -- 16 --   21 1630 (!) 156/95 -- -- -- 18 --   21 1620 126/79 98.2  F (36.8  C) Oral -- 16 98 %   21 1615 131/78 -- -- -- 16 97 %   21 1610 (!) 138/90 -- -- -- 18 99 %   21 1605 (!) 145/101 -- -- -- 18 99 %   21 1600 (!) 142/95 -- -- -- 18 100 %   21 1555 (!) 144/89 -- -- -- 18 100 %   21 1550 (!) 138/93 97.7  F (36.5  C) Oral -- 18 100 %   21 1414 (!) 153/95 98.9  F (37.2  C) Oral 87 18 --       Gen: appears comfortable, NAD  Resp: CTAB  CV: RRR with no murmurs  Abdomen: soft, nontender  Ext: warm, well perfused, trace edema  Neuro: 1+ biceps and brachioradialis reflexes. 0 beats clonus.     I/O  I/O last 3 completed shifts:  In: 3166 [P.O.:2550; I.V.:616]  Out: 1900 [Urine:1900]  I/O this shift:  In: -   Out: 1000 [Urine:1000]    Weight: No weight recorded    UOP: 395 mL/hr    Labs:  Recent Labs   Lab 21  1504 21  0725   HGB 11.9 11.0*     --    CR 0.70  --    AST 16  --    ALT 33  --      A/P: Jorge Anton is a  female PPD#6 s/p .  Pregnancy was complicated by preeclampsia with severe features and COVID in pregnancy. Doing well. No s/s of worsening preeclampsia or magnesium toxicity.    #Preeclampsia with SF  - BPs: normal to low mild range  - Medications: IV Mg 2g/hr, D#1 Nifedipine 30 mg daily  - Symptoms: trace edema  - Labs: HELLP labs wnl  - Weights: none recorded  - UOP: 395 ml/hr,  adequate  - Continue IV magnesium sulfate 2g/h   - Plan to discontinue IV Magnesium at 1549 (24 hours from initiation)  - Continue q4h Mag checks; next at 0530  - Continue routine postpartum care    Jazmyne Macedo MS3    Resident Attestation   I, Elaina Handy, was present with the medical student who participated in the service and in the documentation of the note.  I have verified the history and personally performed the physical exam and medical decision making.  I agree with the assessment and plan of care as documented in the note.  I have reviewed the note and made changes as necessary.    Elaina Handy MD  OB/GYN, PGY-2  12/07/2021, 11:55 PM

## 2021-12-08 NOTE — PROGRESS NOTES
Jorge Anton  : 1985  MRN: [id number]    Postpartum Magnesium Check    S: Paged to room by RN due to inability to elicit reflexes. Patient is trying to sleep, feeling well. Denies headache (has resolved from earlier), vision changes, chest pain, SOB, RUQ pain, or worsening edema. Endorses some floaters in her vision overnight, but currently they are not present.    O:   Patient Vitals for the past 24 hrs:   BP Temp Temp src Pulse Resp SpO2   21 0241 119/72 -- -- 73 20 97 %   21 2245 (!) 141/90 -- -- 70 18 --   21 2105 133/78 98  F (36.7  C) Oral 77 18 96 %   21 1915 129/86 -- -- -- 18 --   21 1800 (!) 134/94 -- -- -- 18 --   21 1700 (!) 146/90 -- -- -- 18 --   21 1645 (!) 115/90 -- -- -- 16 --   21 1630 (!) 156/95 -- -- -- 18 --   21 1620 126/79 98.2  F (36.8  C) Oral -- 16 98 %   21 1615 131/78 -- -- -- 16 97 %   21 1610 (!) 138/90 -- -- -- 18 99 %   21 1605 (!) 145/101 -- -- -- 18 99 %   21 1600 (!) 142/95 -- -- -- 18 100 %   21 1555 (!) 144/89 -- -- -- 18 100 %   21 1550 (!) 138/93 97.7  F (36.5  C) Oral -- 18 100 %   21 1414 (!) 153/95 98.9  F (37.2  C) Oral 87 18 --       Gen: appears comfortable, NAD  Resp: CTAB  CV: RRR with no murmurs  Abdomen: soft, nontender  Ext: warm, well perfused, trace edema  Neuro: 1+ biceps and brachioradialis reflexes, faintly present bilaterally. 0 beats clonus.     I/O  I/O last 3 completed shifts:  In: 3166 [P.O.:2550; I.V.:616]  Out: 1900 [Urine:1900]  I/O this shift:  In: 900 [P.O.:900]  Out:  [Urine:]    Weight: No weight recorded    Labs:  Recent Labs   Lab 21  1504 21  0725   HGB 11.9 11.0*     --    CR 0.70  --    AST 16  --    ALT 33  --      A/P: Jorge Anton is a  female PPD#7 s/p .  Pregnancy was complicated by preeclampsia with severe features and COVID in pregnancy. Doing well. No s/s of worsening preeclampsia or magnesium  toxicity.    #Preeclampsia with SF  - BPs: normal to low mild range  - Medications: IV Mg 2g/hr, D#2 Nifedipine 30 mg daily  - Symptoms: trace edema  - Labs: HELLP labs wnl  - Weights: none recorded, plan for daily weights  - UOP:  adequate  - Continue IV magnesium sulfate 2g/h   - Plan to discontinue IV Magnesium at 1549 (24 hours from initiation)  - Continue q4h Mag checks   - Reflexes 1+ bilaterally, faint but still present. Low concern for Mg toxicity at this time given present reflexes, will continue to monitor closely.  - Continue routine postpartum care    Elaina Handy MD  OB/GYN, PGY-2  12/08/2021, 2:54 AM

## 2021-12-08 NOTE — PLAN OF CARE
Patient here with pre eclampsia with severe features after discharge. VS as charted; patient tolerating magnesium well. Headache less than when she arrived. Infant and spouse are here with infant in bili blanket being managed by dad and outpatient physician. Anticipate 24 hours of magnesium and reassess.

## 2021-12-08 NOTE — PLAN OF CARE
VSS. Pt reports headache returned and worsened this morning at 0430. Pt reports floaters have remained unchanged still present; possibly slightly improved prior to 0430 check.   Unable to illicit radial or brachial reflexes. No clonus noted.   Mg+ infusing at 2g/hr with LR 75mL/hr. New IV initiated d/t placement and alarming with discomfort of previous PIV in AC.   Pt voiding large amounts each time and drinking adequate fluids.   [place] at the bedside on bili blanket under the care of spouse. Mother pumping and breastfeeding as well as bottle feeding pumped milk. Education provided to mother about storage of milk in [place] vs. Fridge in the room.   Continue to monitor.

## 2021-12-08 NOTE — PROVIDER NOTIFICATION
MgSO4 - bag will end about 1330, can we end infusion then or start new bag for 2 hours.  Either way, can I get a DC order please? Thank you.      12/08/21 1208   Provider Notification   Provider Name/Title MD Lezama   Method of Notification Electronic Page   Request Evaluate-Remote   Notification Reason Medication Request

## 2021-12-08 NOTE — PLAN OF CARE
VSS, BP mild range, no severe rang BP on this shift. Pt denies HA, visual changes, RUQ, SOB or chest pain. Mag check WDL, no s/s of mag toxicity. Pt pumping for  and able to breastfeed, partner in room caring for and managing baby with bili blanked Continue current plan of care.

## 2021-12-08 NOTE — PROGRESS NOTES
Post Partum Progress Note  PPD#7  Subjective:  She is resting comfortably in bed this morning.  Pain is improving and well controlled on current medication regimen. Tolerating PO intake.  Lochia mild.  Voiding without difficulty. Ambulating without dizziness or difficulty. Mild headache, changes in vision, nausea/vomiting, chest pain, shortness of breath, RUQ pain, or worsening edema.  She is breast feeding. No other questions or concerns today.    Objective:  Vitals:    21 2105 21 2245 21 0241 21 0439   BP: 133/78 (Abnormal) 141/90 119/72 121/83   BP Location:       Pulse: 77 70 73 95   Resp: 18 18 20 20   Temp: 98  F (36.7  C)   97.8  F (36.6  C)   TempSrc: Oral   Oral   SpO2: 96%  97% 100%       General: NAD. A&Ox3.  CV: Well-perfused  Pulm: Normal respiratory effort.  Abd: Soft, non-tender, non-distended. Fundus is firm and below the umbilicus.  Ext: trace edema. No calf tenderness.  Neuro: 1+ Biceps and brachioradialis.No clonus.     Labs:   2021 15:04   Creatinine 0.70   GFR Estimate >90   ALT 33   AST 16   WBC 7.3   Hemoglobin 11.9   Hematocrit 37.5   Platelet Count 259   RBC Count 4.20   MCV 89   MCH 28.3   MCHC 31.7   RDW 14.8     Wt Readings from Last 5 Encounters:   21 90.7 kg (200 lb)   10/01/21 85.3 kg (188 lb)   16 73.9 kg (163 lb)   16 76.2 kg (168 lb 1.6 oz)   10/14/11 86.2 kg (190 lb)     I/O  PO 1400 past 24h  IV 1231 IV past 24h  UOP 2900 past 24h    Assessment/Plan:  Jorge Anton is a 36 year old  female who is PPD#7 s/p  with the CNM service. She presented to clinic for a severe range BP at home as well as worsening headaches, and floaters. She is admitted for IV Mag and management of her pressures.    # Preeclampsia w/ Severe Features  - BPs: normal to low mild range  - Medications: IV Mg 2g/hr, D#2 Nifedipine 30 mg daily  - Symptoms: trace edema  - Labs: HELLP labs wnl  - Weights: none recorded, will discuss w/ RN  - UOP: adequate  -  Continue IV magnesium sulfate 2g/h   - Plan to discontinue IV Magnesium at 1549 (24 hours from initiation)  - Continue q4h Mag checks    # Routine postpartum  - Encourage routine post-operative goals including ambulation and incentive spirometry  - PNC: Rh +. Rubella immune. No intervention indicated.  - Pain: controlled on oral medications  - : Voiding spontaneously.  - Infant: Stable in room  - Feeding: Breast  - BC: undecided    Anticipate discharge to home 24 hours after discontinuing Mg, pending blood pressure control.  Follow-up in clinic in 1 week for BP check, and 6 weeks with CNM for routine postpartum visit.    Charmaine Abraham MD MSc  OBGYN Resident, PGY3  December 8, 2021, 7:51 AM    Staff MD Note    I appreciate the note by Dr. Abraham.  Any necessary changes have been made by me.  I saw and evaluated the patient and agree with the findings and plan of care as documented in the note.    Jyoti Alejandre MD

## 2021-12-08 NOTE — PROGRESS NOTES
Magnesium Check    Patient was using the bathroom when team came by to do magnesium check. Talked with patient's nurse who had just seen patient a few minutes before. At that time, patient had normal reflexes. Her headache had improved from a 9/10 with 10 being the worst to a 2/10. Tylenol has been helping with headaches. No s/s worsening preeclampsia or Magnesium Toxicity per nurse. Will plan to repeat Mg check in 4 hours at 2330.    BPs:  BP Readings from Last 6 Encounters:   12/07/21 129/86   12/07/21 (!) 155/101   12/02/21 117/77   10/01/21 118/79   10/22/11 109/76   08/16/10 117/80       I/Os:  Intake/Output Summary (Last 24 hours) at 12/7/2021 2034  Last data filed at 12/7/2021 1937  Gross per 24 hour   Intake 2300 ml   Output 1900 ml   Net 400 ml     90.7 kg (actual weight)    UOP: 3.68 mL/kg/hr    Jazmyne Macedo MS3    Resident Attestation   I, Elaina Handy, was present with the medical student who participated in the service and in the documentation of the note.  I have verified the history and personally performed the physical exam and medical decision making.  I agree with the assessment and plan of care as documented in the note.  I have reviewed the note and made changes as necessary.    Elaina Handy MD  OB/GYN, PGY-2  12/07/2021, 9:13 PM

## 2021-12-09 ENCOUNTER — HOSPITAL ENCOUNTER (INPATIENT)
Facility: CLINIC | Age: 36
End: 2021-12-09
Admitting: OBSTETRICS & GYNECOLOGY
Payer: COMMERCIAL

## 2021-12-09 VITALS
RESPIRATION RATE: 18 BRPM | TEMPERATURE: 99.1 F | HEART RATE: 86 BPM | WEIGHT: 188.3 LBS | DIASTOLIC BLOOD PRESSURE: 81 MMHG | SYSTOLIC BLOOD PRESSURE: 118 MMHG | OXYGEN SATURATION: 100 % | BODY MASS INDEX: 33.36 KG/M2

## 2021-12-09 PROCEDURE — 250N000013 HC RX MED GY IP 250 OP 250 PS 637: Performed by: STUDENT IN AN ORGANIZED HEALTH CARE EDUCATION/TRAINING PROGRAM

## 2021-12-09 RX ORDER — NIFEDIPINE 30 MG
30 TABLET, EXTENDED RELEASE ORAL DAILY
Qty: 40 TABLET | Refills: 0 | Status: SHIPPED | OUTPATIENT
Start: 2021-12-09

## 2021-12-09 RX ADMIN — NIFEDIPINE 30 MG: 30 TABLET, FILM COATED, EXTENDED RELEASE ORAL at 07:43

## 2021-12-09 NOTE — PLAN OF CARE
Vss, patient denies HA, visual changes, or RUQ pain. Reflexes normal, no clonus. Patient has declined pain meds this shift. Patient has taken procardia this morning. Patient is independent with self care. Infant is in the room along with FOB who is helping with infant cares. Continue with plan of care.

## 2021-12-09 NOTE — PLAN OF CARE
Discontinued Mag+ per orders around 1600. Patient still c/o HA, but improving, edema present, but no other symptoms. Pumping good amounts for infant. Up and around in the room. No other concerns at this time.

## 2021-12-09 NOTE — PLAN OF CARE
VSS. Pt denies pre/e s/s. Edema present. Pt is hyporeflexic.   Denies pain. Sleeping between cares.   Voiding adequate amounts. Pumping and breastfeeding  in room with SO.   LC pending per mother request. Plan for discharge today.

## 2021-12-09 NOTE — PLAN OF CARE
[id number]: Patient's blood pressure was 115/87. No clonus and reflexes +1 L leg and +2 R leg. She is edematous, but denies headache, visual changes and epigastric pain. Continue to monitor and support as needed.

## 2021-12-09 NOTE — LACTATION NOTE
Consult for maternal request, mom wondering about possible lip tie for this baby as two of her five have had this. With the two that did, they did not have it revised as it did not interfere with feedings (one fell when a little older and tore it on their own). Jorge also shared during visit that she is using nipple shield on left side only due to flatter nipple that side and trouble latching without it. Shares two of her other children had difficulty that side also, one never fed well there at all and she really wants to avoid that this time - did not like the uneven supply and breast asymmetry that resulted during lactation. She endorses baby not emptying breast as well with the shield, feels breast still somewhat full after so has been using the [place]. Since infant has been jaundiced (ABO incompatability and history of cephalohematoma, he is under phototherapy using rental bili blanket that they brought in),they have been feeding back to baby all she gets from this expression and shares he is already gaining weight. Changed one diaper during visit, very heavy with wet and large amount (2/3 diaper full) of very dark brown colored, soft and seedy stool. Parents share stool had been yellow and seedy at home but more brown since starting phototherapy. They report he is having bilirubin and Hgb monitored outpatient (will be in for return draw today), they will also report dark brown color of stool to his provider. Being he has normal, seedy consistency with good volume and frequency of stools, along with previously yellow color prior to phototherapy, suspect this is excretion of bilirubin and not still having meconium at 8 days of age but encouraged to share color with his provider to assure no concerns.     Oral exam of infant WNL. Upper lip lifts easily, point out mildly prominent frenulum but flexible and does not appear to restrict movement of lip or latch, suck at all. Excellent lift and extension noted  spontaneously with full range of motion, organized suck on finger.     Returned at time of feeding, demo ways to compress lower areola and aim that to lower lip with nipple to roof of mouth when latching. Demo then had Jorge return demo. Infant latched readily with no hesitation, had excellent feeding with frequent swallows. He briefly pulled back some with initial letdown but stayed latched, then relaxed while feeding. Jorge denies pain with latch and felt much better about it, feels no need for shield when he will feed well with more of lower areola in mouth. Offer support and encouragement, mom denies any other questions or concerns.

## 2021-12-09 NOTE — PLAN OF CARE
Data: Vital signs stable, assessments within normal limits.   Patient instructed of signs/symptoms regarding pre-e and hypertension to look for and report per discharge instructions.   Discharge outcomes on care plan met.   Pain well controlled with oral medication.    Action: Review of care plan, teaching, and discharge instructions done with patient. AVS printed and patient given a copy. Patient verification with signature obtained. Discharge medication given and instructions for use covered.    Response: Patient verbalized understanding of all education provided. Patient stated comfort with home cares and follow-up. All questions addressed.     Patient discharged 1700.

## 2021-12-09 NOTE — PROGRESS NOTES
Post Partum Progress Note  PPD#8  Subjective:  She is up walking to bathroom this morning. Pain is improving and well controlled on current medication regimen. Tolerating PO intake.  Lochia mild.  Voiding without difficulty. Ambulating without dizziness or difficulty. No headaches, changes in vision, nausea/vomiting, chest pain, shortness of breath, RUQ pain, or worsening edema.  She is breast feeding. No other questions or concerns today.    Objective:  Vitals:    21 0015 21 0523 21 0741 21 0846   BP: 118/65 105/74  127/63   BP Location:       Pulse: 76 66  93   Resp: 18 18  18   Temp: 98  F (36.7  C)   98.3  F (36.8  C)   TempSrc: Oral   Oral   SpO2:       Weight:   85.4 kg (188 lb 4.8 oz)        General: NAD. A&Ox3.  CV: Well-perfused  Pulm: Normal respiratory effort.  Abd: Soft, non-tender, non-distended. Fundus is firm and below the umbilicus.  Ext: trace edema. No calf tenderness.    Labs:   2021 15:04   Creatinine 0.70   GFR Estimate >90   ALT 33   AST 16   WBC 7.3   Hemoglobin 11.9   Hematocrit 37.5   Platelet Count 259   RBC Count 4.20   MCV 89   MCH 28.3   MCHC 31.7   RDW 14.8     Wt Readings from Last 5 Encounters:   21 86.5 kg (190 lb 11.2 oz)   21 90.7 kg (200 lb)   10/01/21 85.3 kg (188 lb)   16 73.9 kg (163 lb)   16 76.2 kg (168 lb 1.6 oz)     I/O    Intake/Output Summary (Last 24 hours) at 2021 0634  Last data filed at 2021 0524  Gross per 24 hour   Intake 4765.5 ml   Output 4200 ml   Net 565.5 ml         Assessment/Plan:  Jorge Anton is a 36 year old  female who is PPD#8 s/p  with the CNM service. She presented to clinic for a severe range BP at home as well as worsening headaches, and floaters. She is admitted for IV Mag and management of her pressures.    # Preeclampsia w/ Severe Features  - BPs: normal range  - Medications: D#3 Nifedipine 30 mg daily  - Symptoms: trace edema  - Labs: HELLP labs wnl  - Weights: 86.5 kg  -  UOP: 2.6 mL/kg/hr; adequate  - s/p Magnesium sulfate for 24 hours    # Routine postpartum  - Encourage routine post-operative goals including ambulation and incentive spirometry  - PNC: Rh +. Rubella immune. No intervention indicated.  - Pain: controlled on oral medications  - : Voiding spontaneously.  - Infant: Stable in room  - Feeding: Breast  - BC: undecided    Anticipate discharge to home 24 hours after discontinuing Mg, pending blood pressure control.  Follow-up in clinic in 1 week for BP check, and 6 weeks with CNM for routine postpartum visit.    Jazmyne Macedo, MS3    Resident Attestation   I, Elaina Handy, was present with the medical student who participated in the service and in the documentation of the note.  I have verified the history and personally performed the physical exam and medical decision making.  I agree with the assessment and plan of care as documented in the note.  I have reviewed the note and made changes as necessary.    Elaina Handy MD  OB/GYN, PGY-2  12/09/2021, 6:47 AM     Appreciate Dr. Handy and student doctor Remington's note above, patient also seen and examined by me. I agree with the note above. Likely discharge to home this afternoon.  Patty Eddy MD

## 2021-12-10 ENCOUNTER — PATIENT OUTREACH (OUTPATIENT)
Dept: CARE COORDINATION | Facility: CLINIC | Age: 36
End: 2021-12-10
Payer: COMMERCIAL

## 2021-12-10 DIAGNOSIS — Z71.89 OTHER SPECIFIED COUNSELING: ICD-10-CM

## 2021-12-10 NOTE — PROGRESS NOTES
Clinic Care Coordination Contact  [facility]/Voicemail       Clinical Data: Care Coordinator Outreach  Outreach attempted x 1.  Left message on patient's voicemail with call back information and requested return call.  Plan:Care Coordinator will try to reach patient again in 1-2 business days.    Joanna Sargent, [facility]  [phone]  [facility]

## 2021-12-11 NOTE — PROGRESS NOTES
Clinic Care Coordination Contact  [facility]/Voicemail       Clinical Data: Care Coordinator Outreach  Outreach attempted x 2.  Left message on patient's voicemail with call back information and requested return call.  Plan: Care Coordinator will try to reach patient again in 1-2 business days.    Joanna Sargent, [facility]  [phone]  [facility]

## 2021-12-16 ENCOUNTER — TELEPHONE (OUTPATIENT)
Dept: OBGYN | Facility: CLINIC | Age: 36
End: 2021-12-16
Payer: COMMERCIAL

## 2021-12-16 NOTE — TELEPHONE ENCOUNTER
Message received from patient regarding symptoms of breast pain/sorness. Pt had  .    Called and spoke with patient. States she feels better since calling. States she thinks she was having engorgement and possible clogged duct. States soreness is improved. Denies fever/chills/aches. States she has mastitis before and it does not feel like that at this time. States she has been making sure she empties her breasts and doing breast massage. States infant slept more last night and may have contributed to her engorgement. States she will call back if symptoms worsen or she becomes febrile.     Encouraged her to reach out with any questions or concerns. Pt verbalized understanding and agreement.

## 2021-12-17 ENCOUNTER — TELEPHONE (OUTPATIENT)
Dept: OBGYN | Facility: CLINIC | Age: 36
End: 2021-12-17
Payer: COMMERCIAL

## 2021-12-17 DIAGNOSIS — N61.0 MASTITIS: Primary | ICD-10-CM

## 2021-12-17 RX ORDER — DICLOXACILLIN SODIUM 500 MG
500 CAPSULE ORAL 4 TIMES DAILY
Qty: 40 CAPSULE | Refills: 0 | Status: SHIPPED | OUTPATIENT
Start: 2021-12-17

## 2021-12-17 NOTE — TELEPHONE ENCOUNTER
----- Message from Cecille Hankins RN sent at 12/17/2021  7:41 AM CST -----  Regarding: mastitis sxs  Symptoms worsened again and has fever of 102 (message at 430 PM)

## 2021-12-17 NOTE — TELEPHONE ENCOUNTER
Called Jorge.  She is feeling a little better today, still with pain and lumps in both breasts.  Fever is managed with tylenol and ibu.      Denies allergies to antibiotics.    Reviewed management and follow up and prescription sent to pharmacy

## 2022-01-07 ENCOUNTER — TELEPHONE (OUTPATIENT)
Dept: OBGYN | Facility: CLINIC | Age: 37
End: 2022-01-07

## 2022-01-07 NOTE — TELEPHONE ENCOUNTER
Hold nifedipine, check BP daily for 1 week. If symptoms of pre-e or BP > 160/110, call office. Needs followup with a primary care physician to monitor for development of hypertension.     Dr. Spain

## 2022-01-07 NOTE — TELEPHONE ENCOUNTER
Jorge left a message on triage voicemail that she had concerns that blood pressures were now too low after treating for hypertension in pregnancy.    Left message for patient to call back.  It appears that she was delivered by our providers, but had prenatal care elsewhere, and was told to follow up with them in 1 and 6 weeks when seen in clinic here on 12/7

## 2022-01-07 NOTE — TELEPHONE ENCOUNTER
"Jorge called back.  She states her prenatal care was at a birth center, and they do not manage blood pressure concerns.      She states that this AM, she forgot to take her nifedipine, and blood pressure was 138/99.  2 hours after nifedipine, blood pressure was 96/67.  She says she felt a little light headed and \"weird\".    Discussed that she should have a blood pressure follow up in clinic, but she would prefer not to come all the way here as she lives in [place].     Routed to Dr Spain  "